# Patient Record
Sex: MALE | URBAN - METROPOLITAN AREA
[De-identification: names, ages, dates, MRNs, and addresses within clinical notes are randomized per-mention and may not be internally consistent; named-entity substitution may affect disease eponyms.]

---

## 2020-01-03 ENCOUNTER — NEW PATIENT (OUTPATIENT)
Dept: URBAN - METROPOLITAN AREA CLINIC 79 | Facility: CLINIC | Age: 65
End: 2020-01-03

## 2020-01-03 DIAGNOSIS — Z79.4: ICD-10-CM

## 2020-01-03 DIAGNOSIS — E11.3213: ICD-10-CM

## 2020-01-03 PROCEDURE — 92134 CPTRZ OPH DX IMG PST SGM RTA: CPT

## 2020-01-03 PROCEDURE — 99204 OFFICE O/P NEW MOD 45 MIN: CPT | Mod: 25

## 2020-01-03 PROCEDURE — 67028 INJECTION EYE DRUG: CPT

## 2020-01-03 ASSESSMENT — VISUAL ACUITY
OS_CC: 20/50
OD_CC: 20/25+2

## 2020-01-03 ASSESSMENT — TONOMETRY
OS_IOP_MMHG: 16
OD_IOP_MMHG: 19

## 2025-06-09 PROBLEM — Z00.00 ENCOUNTER FOR PREVENTIVE HEALTH EXAMINATION: Status: ACTIVE | Noted: 2025-06-09

## 2025-06-10 ENCOUNTER — INPATIENT (INPATIENT)
Facility: HOSPITAL | Age: 70
LOS: 1 days | Discharge: HOME | End: 2025-06-12
Attending: STUDENT IN AN ORGANIZED HEALTH CARE EDUCATION/TRAINING PROGRAM | Admitting: STUDENT IN AN ORGANIZED HEALTH CARE EDUCATION/TRAINING PROGRAM
Payer: MEDICARE

## 2025-06-10 ENCOUNTER — APPOINTMENT (OUTPATIENT)
Dept: HEART AND VASCULAR | Facility: CLINIC | Age: 70
End: 2025-06-10
Payer: MEDICARE

## 2025-06-10 VITALS
SYSTOLIC BLOOD PRESSURE: 96 MMHG | HEIGHT: 65 IN | HEART RATE: 66 BPM | DIASTOLIC BLOOD PRESSURE: 59 MMHG | BODY MASS INDEX: 22.33 KG/M2 | WEIGHT: 134 LBS | OXYGEN SATURATION: 98 %

## 2025-06-10 VITALS
RESPIRATION RATE: 16 BRPM | SYSTOLIC BLOOD PRESSURE: 150 MMHG | WEIGHT: 130.07 LBS | TEMPERATURE: 98 F | DIASTOLIC BLOOD PRESSURE: 82 MMHG | OXYGEN SATURATION: 98 % | HEART RATE: 66 BPM

## 2025-06-10 DIAGNOSIS — E78.5 HYPERLIPIDEMIA, UNSPECIFIED: ICD-10-CM

## 2025-06-10 DIAGNOSIS — I51.3 INTRACARDIAC THROMBOSIS, NOT ELSEWHERE CLASSIFIED: ICD-10-CM

## 2025-06-10 DIAGNOSIS — I50.20 UNSPECIFIED SYSTOLIC (CONGESTIVE) HEART FAILURE: ICD-10-CM

## 2025-06-10 DIAGNOSIS — I25.10 ATHEROSCLEROTIC HEART DISEASE OF NATIVE CORONARY ARTERY WITHOUT ANGINA PECTORIS: ICD-10-CM

## 2025-06-10 DIAGNOSIS — E11.9 TYPE 2 DIABETES MELLITUS WITHOUT COMPLICATIONS: ICD-10-CM

## 2025-06-10 LAB
ADD ON TEST-SPECIMEN IN LAB: SIGNIFICANT CHANGE UP
ANION GAP SERPL CALC-SCNC: 12 MMOL/L — SIGNIFICANT CHANGE UP (ref 5–17)
BASOPHILS # BLD AUTO: 0.03 K/UL — SIGNIFICANT CHANGE UP (ref 0–0.2)
BASOPHILS NFR BLD AUTO: 0.4 % — SIGNIFICANT CHANGE UP (ref 0–2)
BLD GP AB SCN SERPL QL: NEGATIVE — SIGNIFICANT CHANGE UP
BUN SERPL-MCNC: 27 MG/DL — HIGH (ref 7–23)
CALCIUM SERPL-MCNC: 9.1 MG/DL — SIGNIFICANT CHANGE UP (ref 8.4–10.5)
CHLORIDE SERPL-SCNC: 100 MMOL/L — SIGNIFICANT CHANGE UP (ref 96–108)
CO2 SERPL-SCNC: 25 MMOL/L — SIGNIFICANT CHANGE UP (ref 22–31)
CREAT SERPL-MCNC: 1.09 MG/DL — SIGNIFICANT CHANGE UP (ref 0.5–1.3)
EGFR: 74 ML/MIN/1.73M2 — SIGNIFICANT CHANGE UP
EGFR: 74 ML/MIN/1.73M2 — SIGNIFICANT CHANGE UP
EOSINOPHIL # BLD AUTO: 0.19 K/UL — SIGNIFICANT CHANGE UP (ref 0–0.5)
EOSINOPHIL NFR BLD AUTO: 2.4 % — SIGNIFICANT CHANGE UP (ref 0–6)
GLUCOSE SERPL-MCNC: 156 MG/DL — HIGH (ref 70–99)
HCT VFR BLD CALC: 37.4 % — LOW (ref 39–50)
HGB BLD-MCNC: 12.6 G/DL — LOW (ref 13–17)
IMM GRANULOCYTES NFR BLD AUTO: 0.4 % — SIGNIFICANT CHANGE UP (ref 0–0.9)
LYMPHOCYTES # BLD AUTO: 2.8 K/UL — SIGNIFICANT CHANGE UP (ref 1–3.3)
LYMPHOCYTES # BLD AUTO: 35.3 % — SIGNIFICANT CHANGE UP (ref 13–44)
MCHC RBC-ENTMCNC: 30.3 PG — SIGNIFICANT CHANGE UP (ref 27–34)
MCHC RBC-ENTMCNC: 33.7 G/DL — SIGNIFICANT CHANGE UP (ref 32–36)
MCV RBC AUTO: 89.9 FL — SIGNIFICANT CHANGE UP (ref 80–100)
MONOCYTES # BLD AUTO: 0.59 K/UL — SIGNIFICANT CHANGE UP (ref 0–0.9)
MONOCYTES NFR BLD AUTO: 7.4 % — SIGNIFICANT CHANGE UP (ref 2–14)
NEUTROPHILS # BLD AUTO: 4.3 K/UL — SIGNIFICANT CHANGE UP (ref 1.8–7.4)
NEUTROPHILS NFR BLD AUTO: 54.1 % — SIGNIFICANT CHANGE UP (ref 43–77)
NRBC BLD AUTO-RTO: 0 /100 WBCS — SIGNIFICANT CHANGE UP (ref 0–0)
PLATELET # BLD AUTO: 180 K/UL — SIGNIFICANT CHANGE UP (ref 150–400)
POTASSIUM SERPL-MCNC: 5 MMOL/L — SIGNIFICANT CHANGE UP (ref 3.5–5.3)
POTASSIUM SERPL-SCNC: 5 MMOL/L — SIGNIFICANT CHANGE UP (ref 3.5–5.3)
RBC # BLD: 4.16 M/UL — LOW (ref 4.2–5.8)
RBC # FLD: 16.2 % — HIGH (ref 10.3–14.5)
RH IG SCN BLD-IMP: POSITIVE — SIGNIFICANT CHANGE UP
SODIUM SERPL-SCNC: 137 MMOL/L — SIGNIFICANT CHANGE UP (ref 135–145)
TROPONIN T, HIGH SENSITIVITY RESULT: 35 NG/L — SIGNIFICANT CHANGE UP (ref 0–51)
WBC # BLD: 7.94 K/UL — SIGNIFICANT CHANGE UP (ref 3.8–10.5)
WBC # FLD AUTO: 7.94 K/UL — SIGNIFICANT CHANGE UP (ref 3.8–10.5)

## 2025-06-10 PROCEDURE — 93010 ELECTROCARDIOGRAM REPORT: CPT

## 2025-06-10 PROCEDURE — 99285 EMERGENCY DEPT VISIT HI MDM: CPT

## 2025-06-10 PROCEDURE — 99205 OFFICE O/P NEW HI 60 MIN: CPT

## 2025-06-10 RX ORDER — DAPAGLIFLOZIN 5 MG/1
10 TABLET, FILM COATED ORAL DAILY
Refills: 0 | Status: DISCONTINUED | OUTPATIENT
Start: 2025-06-11 | End: 2025-06-12

## 2025-06-10 RX ORDER — ISOSORBIDE MONONITRATE 60 MG/1
60 TABLET, EXTENDED RELEASE ORAL DAILY
Refills: 0 | Status: DISCONTINUED | OUTPATIENT
Start: 2025-06-11 | End: 2025-06-12

## 2025-06-10 RX ORDER — HEPARIN SODIUM 1000 [USP'U]/ML
2000 INJECTION INTRAVENOUS; SUBCUTANEOUS EVERY 6 HOURS
Refills: 0 | Status: DISCONTINUED | OUTPATIENT
Start: 2025-06-10 | End: 2025-06-11

## 2025-06-10 RX ORDER — GLUCAGON 3 MG/1
1 POWDER NASAL ONCE
Refills: 0 | Status: DISCONTINUED | OUTPATIENT
Start: 2025-06-10 | End: 2025-06-12

## 2025-06-10 RX ORDER — DEXTROSE 50 % IN WATER 50 %
25 SYRINGE (ML) INTRAVENOUS ONCE
Refills: 0 | Status: DISCONTINUED | OUTPATIENT
Start: 2025-06-10 | End: 2025-06-12

## 2025-06-10 RX ORDER — DEXTROSE 50 % IN WATER 50 %
12.5 SYRINGE (ML) INTRAVENOUS ONCE
Refills: 0 | Status: DISCONTINUED | OUTPATIENT
Start: 2025-06-10 | End: 2025-06-12

## 2025-06-10 RX ORDER — INSULIN LISPRO 100 U/ML
INJECTION, SOLUTION INTRAVENOUS; SUBCUTANEOUS AT BEDTIME
Refills: 0 | Status: DISCONTINUED | OUTPATIENT
Start: 2025-06-10 | End: 2025-06-12

## 2025-06-10 RX ORDER — HEPARIN SODIUM 1000 [USP'U]/ML
4500 INJECTION INTRAVENOUS; SUBCUTANEOUS EVERY 6 HOURS
Refills: 0 | Status: DISCONTINUED | OUTPATIENT
Start: 2025-06-10 | End: 2025-06-11

## 2025-06-10 RX ORDER — SODIUM CHLORIDE 9 G/1000ML
1000 INJECTION, SOLUTION INTRAVENOUS
Refills: 0 | Status: DISCONTINUED | OUTPATIENT
Start: 2025-06-10 | End: 2025-06-12

## 2025-06-10 RX ORDER — INSULIN GLARGINE-YFGN 100 [IU]/ML
10 INJECTION, SOLUTION SUBCUTANEOUS AT BEDTIME
Refills: 0 | Status: DISCONTINUED | OUTPATIENT
Start: 2025-06-10 | End: 2025-06-11

## 2025-06-10 RX ORDER — RANOLAZINE 1000 MG/1
500 TABLET, FILM COATED, EXTENDED RELEASE ORAL
Refills: 0 | Status: DISCONTINUED | OUTPATIENT
Start: 2025-06-11 | End: 2025-06-12

## 2025-06-10 RX ORDER — ASPIRIN 325 MG
81 TABLET ORAL DAILY
Refills: 0 | Status: DISCONTINUED | OUTPATIENT
Start: 2025-06-11 | End: 2025-06-12

## 2025-06-10 RX ORDER — ATORVASTATIN CALCIUM 80 MG/1
80 TABLET, FILM COATED ORAL AT BEDTIME
Refills: 0 | Status: DISCONTINUED | OUTPATIENT
Start: 2025-06-10 | End: 2025-06-12

## 2025-06-10 RX ORDER — DEXTROSE 50 % IN WATER 50 %
15 SYRINGE (ML) INTRAVENOUS ONCE
Refills: 0 | Status: DISCONTINUED | OUTPATIENT
Start: 2025-06-10 | End: 2025-06-12

## 2025-06-10 RX ORDER — HEPARIN SODIUM 1000 [USP'U]/ML
INJECTION INTRAVENOUS; SUBCUTANEOUS
Qty: 25000 | Refills: 0 | Status: DISCONTINUED | OUTPATIENT
Start: 2025-06-10 | End: 2025-06-11

## 2025-06-10 RX ORDER — METOPROLOL SUCCINATE 50 MG/1
25 TABLET, EXTENDED RELEASE ORAL DAILY
Refills: 0 | Status: DISCONTINUED | OUTPATIENT
Start: 2025-06-11 | End: 2025-06-12

## 2025-06-10 RX ORDER — GABAPENTIN 400 MG/1
600 CAPSULE ORAL THREE TIMES A DAY
Refills: 0 | Status: DISCONTINUED | OUTPATIENT
Start: 2025-06-11 | End: 2025-06-12

## 2025-06-10 RX ORDER — INSULIN LISPRO 100 U/ML
INJECTION, SOLUTION INTRAVENOUS; SUBCUTANEOUS
Refills: 0 | Status: DISCONTINUED | OUTPATIENT
Start: 2025-06-10 | End: 2025-06-12

## 2025-06-10 RX ADMIN — HEPARIN SODIUM 1100 UNIT(S)/HR: 1000 INJECTION INTRAVENOUS; SUBCUTANEOUS at 21:58

## 2025-06-10 RX ADMIN — INSULIN GLARGINE-YFGN 10 UNIT(S): 100 INJECTION, SOLUTION SUBCUTANEOUS at 22:34

## 2025-06-10 RX ADMIN — ATORVASTATIN CALCIUM 80 MILLIGRAM(S): 80 TABLET, FILM COATED ORAL at 22:17

## 2025-06-10 NOTE — H&P ADULT - ASSESSMENT
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 Salmeron for a second opinion regarding his known severe CAD. Admitted to [facility] Cardiac Telemetry for stable angina and known severe CAD with plan for R/LHC with Dr. Cristian Salmeron.

## 2025-06-10 NOTE — ED ADULT TRIAGE NOTE - CHIEF COMPLAINT QUOTE
pt complaining of chest pain sob and weakness on exertion x months pt denies complaints at rest were sent by CTS MD for admission no hx cardiac stent previously

## 2025-06-10 NOTE — H&P ADULT - NSHPLABSRESULTS_GEN_ALL_CORE
12.6   7.94  )-----------( 180      ( 10 Rufus 2025 17:54 )             37.4       06-10    137  |  100  |  27[H]  ----------------------------<  156[H]  5.0   |  25  |  1.09    Ca    9.1      10 Rufus 2025 17:54  Mg     1.9     06-10        PT/INR - ( 10 Rufus 2025 17:54 )   PT: 12.3 sec;   INR: 1.07          PTT - ( 10 Rufus 2025 17:54 )  PTT:36.9 sec    CARDIAC MARKERS ( 10 Rufus 2025 17:54 )  x     / x     / x     / x     / 3.0 ng/mL        Urinalysis Basic - ( 10 Rufus 2025 17:54 )    Color: x / Appearance: x / SG: x / pH: x  Gluc: 156 mg/dL / Ketone: x  / Bili: x / Urobili: x   Blood: x / Protein: x / Nitrite: x   Leuk Esterase: x / RBC: x / WBC x   Sq Epi: x / Non Sq Epi: x / Bacteria: x        EKG:

## 2025-06-10 NOTE — H&P ADULT - HISTORY OF PRESENT ILLNESS
70yoM, Czech speaking,  poor historian, w/ PMHx HLD, IDDM, severe 3vCAD _____, HFmrEF (EF 45%), LV apical thrombus who presented to outpatient cardiologist Dr. Salmeron for a second opinion regarding his known severe CAD. Patient endorses chest pain _______      [facility] ED course:   Labs significant for: Hgb/HCt 12.6/37.4, RBC 4.16, MCV 89.9, K 5.0, BUN/Cr 27/1.09.  Bedside echo performed by CCU Fellow reportedly preserved EF.    Admitted to [facility] Cardiac Telemetry for stable angina and known severe CAD with plan for R/LHC with Dr. Cristian Salmeron.    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 Salmeron for a second opinion regarding his known severe CAD. Patient endorses chest pain _______      [facility] ED course:   Labs significant for: Hgb/HCt 12.6/37.4, RBC 4.16, MCV 89.9, K 5.0, BUN/Cr 27/1.09.  Bedside echo performed by CCU Fellow reportedly preserved EF.    Admitted to [facility] Cardiac Telemetry for stable angina and known severe CAD with plan for R/LHC with Dr. Cristian Salmeron.    70yoM, Tamaz[place]/English speaking w/ PMHx HLD, IDDM, severe 2vCAD, HFmrEF (EF 45%), LV apical thrombus who presented to outpatient cardiologist Dr. Salmeron for a second opinion regarding his known severe CAD. Patient endorses worsening right sided chest pain for the past month. Patient notes that the pain is with exertion and relieved with rest and denied associated shortness of breath, dizziness and sweatiness. Patient currently denied chest pain, SOB, abdominal pain, fatigue, presyncope, syncope, PND, orthopnea, leg pain/swelling, N/V/D and recent sick contacts.    [facility] ED course:   Labs significant for: Hgb/HCt 12.6/37.4, RBC 4.16, MCV 89.9, K 5.0, BUN/Cr 27/1.09.  Bedside echo performed by CCU Fellow reportedly preserved EF.    Admitted to [facility] Cardiac Telemetry for stable angina and known severe CAD with plan for R/LHC with Dr. Cristian Salmeron.    70yoM, Kiswahili/Englishspeaking, FamHx ?MI (mother and father, passed away at 72) w/ PMHx HLD, IDDM, severe 2vCAD, HFmrEF (EF 45%), LV apical thrombus (on Eliquis last dose 6/10/25 AM) who presented to outpatient cardiologist Dr. Salmeron for a second opinion regarding his known severe CAD. Patient endorses worsening right sided chest pain for the past month. Patient notes that the pain is with exertion and relieved with rest and denied associated shortness of breath, dizziness and sweatiness. Patient currently denied chest pain, SOB, abdominal pain, fatigue, presyncope, syncope, PND, orthopnea, leg pain/swelling, N/V/D and recent sick contacts.    [facility] ED course:   Labs significant for: Hgb/HCt 12.6/37.4, RBC 4.16, MCV 89.9, K 5.0, BUN/Cr 27/1.09.  Bedside echo performed by CCU Fellow reportedly preserved EF.    Admitted to [facility] Cardiac Telemetry for stable angina and known severe CAD with plan for R/LHC with Dr. Cristian Salmeron.    70yoM, Albanian/Englishspeaking, FamHx ?MI (mother and father, passed away at 72) w/ PMHx HLD, IDDM, severe 2vCAD, HFmrEF (EF 45%), LV apical thrombus (on Eliquis last dose 6/10/25 AM) who presented to outpatient cardiologist Dr. Salmeron for a second opinion regarding his known severe CAD. Patient endorses worsening right sided chest pain for the past month. Patient notes that the pain is with exertion and relieved with rest and denied associated shortness of breath, dizziness and sweatiness. Patient currently denied chest pain, SOB, abdominal pain, fatigue, presyncope, syncope, PND, orthopnea, leg pain/swelling, N/V/D and recent sick contacts.    [facility] ED course:   Labs significant for: Hgb/HCt 12.6/37.4, RBC 4.16, MCV 89.9, K 5.0, BUN/Cr 27/1.09.  Bedside echo performed by CCU Fellow reportedly preserved EF.  EKG: NSR, TWI AVL and V2    Admitted to [facility] Cardiac Telemetry for stable angina and known severe CAD with plan for R/LHC with Dr. Cristain Salmeron.

## 2025-06-10 NOTE — ED ADULT NURSE NOTE - NSFALLHARMRISKINTERV_ED_ALL_ED

## 2025-06-10 NOTE — ED PROVIDER NOTE - OBJECTIVE STATEMENT
70 m with Hx DM, HLD  Recent exertional CP, began in April; initially evaluated in [place], medically managed without improvement.  Seen by cardiology today (Dr Salmeron), for cath/PCI tomorrow.  Currently pain-free.  Cardiology fellow performed limited bedside echo in ED, preserved LVEF.

## 2025-06-10 NOTE — ED PROVIDER NOTE - CLINICAL SUMMARY MEDICAL DECISION MAKING FREE TEXT BOX
Stable angina pectoralis for few months; not improving on medical management.  Seen by cardiology today, for cath/PCI tomorrow.  Currently asymptomatic in ED.  No signs of volume overload.  EKG NSR, TWI  Trop 35.  Limited echo by cardiology in ED, preserved EF.

## 2025-06-10 NOTE — ED PROVIDER NOTE - ATTENDING APP SHARED VISIT CONTRIBUTION OF CARE
Patient was interviewed and examined by the PA.  Case discussed with me and management plan formulated together.  Patient was taken to the floor before my assessment but had been seen by a Cardiologist PTA as well as the Cardiology Fellow.

## 2025-06-10 NOTE — H&P ADULT - PROBLEM SELECTOR PLAN 3
- on Lantus 20-22u daily at bedtime + Metformin 1000mg PO BID  - continue MEGAN and Lantus 10u daily at bedtime  o Endocrinology consult in AM  o f/u A1c in AM

## 2025-06-10 NOTE — ED ADULT NURSE NOTE - NS ED NURSE REPORT GIVEN TO FT
waxing and waning passive SI waxing and waning passive SI waxing and waning passive SI waxing and waning passive SI Ebony PANCHAL

## 2025-06-10 NOTE — H&P ADULT - PROBLEM SELECTOR PLAN 1
- LHC (5/9/25): p/mRCA 95%, mRCA 40-50%, 100% RPDA,  short LM, oLAD 95%, oLCx 30%, dLCx 70%, 100% mLAD, no EDP. LV Thrombus.   - RHC (5/9/25): RA 0, PA 21/2/10, CO/CI 4.8/2.5.  - Antianginal: continue home Ranexa 500mg PO BID, Imdur 60mg daily  - on Aspirin and Eliquis at home

## 2025-06-10 NOTE — ED PROVIDER NOTE - PHYSICAL EXAMINATION
CONSTITUTIONAL: pt in NAD   SKIN: Normal color and turgor.    HEAD: NC/AT.  EYES: Conjunctiva clear. Anicteric sclera.  ENT: Airway clear. Normal voice. MMM.  RESPIRATORY:  Normal respiratory rate and effort. Lungs CTA  CARDIOVASCULAR:  RRR S1S2, no MRG  GI:  Abdomen soft, nontender.    MSK: Neck supple.  No limb edema or muscular tenderness. No joint swelling or ROM limitation.  NEURO: Alert, clear mental status.  Speech clear. No focal deficits. Gait steady.

## 2025-06-10 NOTE — H&P ADULT - PROBLEM SELECTOR PLAN 5
- continue home Lipitor 80mg daily  o f/u lipid panel in AM    F: None  E: Replete if K<4 or Mag<2  N: DASH Diet  GIppx: Pantoprazole  VTEppx: Heparin gtt  Dispo: after revascularization  PT: not indicate

## 2025-06-10 NOTE — H&P ADULT - PROBLEM SELECTOR PLAN 2
o f/u limited TTE done by fellow  o formal TTE   - GDMT: continue home Toprol 25mg daily, Valsartan 40 mg daily, Farxiga 10mg daily  - strict intake and output, daily weight, - GDMT: continue home Toprol 25mg daily, Valsartan 40 mg daily, Farxiga 10mg daily  - strict intake and output, daily weight  o f/u limited TTE done by fellow  o formal TTE

## 2025-06-11 ENCOUNTER — RESULT REVIEW (OUTPATIENT)
Age: 70
End: 2025-06-11

## 2025-06-11 LAB
A1C WITH ESTIMATED AVERAGE GLUCOSE RESULT: 10 % — HIGH (ref 4–5.6)
ALBUMIN SERPL ELPH-MCNC: 3.8 G/DL — SIGNIFICANT CHANGE UP (ref 3.3–5)
ALP SERPL-CCNC: 53 U/L — SIGNIFICANT CHANGE UP (ref 40–120)
ALT FLD-CCNC: 17 U/L — SIGNIFICANT CHANGE UP (ref 10–45)
ANION GAP SERPL CALC-SCNC: 9 MMOL/L — SIGNIFICANT CHANGE UP (ref 5–17)
APTT BLD: 157.7 SEC — CRITICAL HIGH (ref 26.1–36.8)
APTT BLD: 171.3 SEC — CRITICAL HIGH (ref 26.1–36.8)
APTT BLD: 68.3 SEC — HIGH (ref 26.1–36.8)
AST SERPL-CCNC: 17 U/L — SIGNIFICANT CHANGE UP (ref 10–40)
BILIRUB SERPL-MCNC: 0.5 MG/DL — SIGNIFICANT CHANGE UP (ref 0.2–1.2)
BLD GP AB SCN SERPL QL: NEGATIVE — SIGNIFICANT CHANGE UP
BUN SERPL-MCNC: 27 MG/DL — HIGH (ref 7–23)
CALCIUM SERPL-MCNC: 9 MG/DL — SIGNIFICANT CHANGE UP (ref 8.4–10.5)
CHLORIDE SERPL-SCNC: 100 MMOL/L — SIGNIFICANT CHANGE UP (ref 96–108)
CHOLEST SERPL-MCNC: 125 MG/DL — SIGNIFICANT CHANGE UP
CO2 SERPL-SCNC: 29 MMOL/L — SIGNIFICANT CHANGE UP (ref 22–31)
CREAT SERPL-MCNC: 1.07 MG/DL — SIGNIFICANT CHANGE UP (ref 0.5–1.3)
EGFR: 76 ML/MIN/1.73M2 — SIGNIFICANT CHANGE UP
EGFR: 76 ML/MIN/1.73M2 — SIGNIFICANT CHANGE UP
ESTIMATED AVERAGE GLUCOSE: 240 MG/DL — HIGH (ref 68–114)
FERRITIN SERPL-MCNC: 24 NG/ML — LOW (ref 30–400)
GLUCOSE SERPL-MCNC: 87 MG/DL — SIGNIFICANT CHANGE UP (ref 70–99)
HCT VFR BLD CALC: 36.6 % — LOW (ref 39–50)
HCT VFR BLD CALC: 37.7 % — LOW (ref 39–50)
HDLC SERPL-MCNC: 43 MG/DL — SIGNIFICANT CHANGE UP
HGB BLD-MCNC: 12.3 G/DL — LOW (ref 13–17)
HGB BLD-MCNC: 12.7 G/DL — LOW (ref 13–17)
INR BLD: 1.18 — HIGH (ref 0.85–1.16)
IRON SATN MFR SERPL: 20 % — SIGNIFICANT CHANGE UP (ref 16–55)
IRON SATN MFR SERPL: 62 UG/DL — SIGNIFICANT CHANGE UP (ref 45–165)
LDLC SERPL-MCNC: 70 MG/DL — SIGNIFICANT CHANGE UP
LIPID PNL WITH DIRECT LDL SERPL: 70 MG/DL — SIGNIFICANT CHANGE UP
MAGNESIUM SERPL-MCNC: 2 MG/DL — SIGNIFICANT CHANGE UP (ref 1.6–2.6)
MCHC RBC-ENTMCNC: 29.4 PG — SIGNIFICANT CHANGE UP (ref 27–34)
MCHC RBC-ENTMCNC: 30 PG — SIGNIFICANT CHANGE UP (ref 27–34)
MCHC RBC-ENTMCNC: 33.6 G/DL — SIGNIFICANT CHANGE UP (ref 32–36)
MCHC RBC-ENTMCNC: 33.7 G/DL — SIGNIFICANT CHANGE UP (ref 32–36)
MCV RBC AUTO: 87.6 FL — SIGNIFICANT CHANGE UP (ref 80–100)
MCV RBC AUTO: 89.1 FL — SIGNIFICANT CHANGE UP (ref 80–100)
NONHDLC SERPL-MCNC: 82 MG/DL — SIGNIFICANT CHANGE UP
NRBC BLD AUTO-RTO: 0 /100 WBCS — SIGNIFICANT CHANGE UP (ref 0–0)
NRBC BLD AUTO-RTO: 0 /100 WBCS — SIGNIFICANT CHANGE UP (ref 0–0)
PLATELET # BLD AUTO: 168 K/UL — SIGNIFICANT CHANGE UP (ref 150–400)
PLATELET # BLD AUTO: 185 K/UL — SIGNIFICANT CHANGE UP (ref 150–400)
POTASSIUM SERPL-MCNC: 4.2 MMOL/L — SIGNIFICANT CHANGE UP (ref 3.5–5.3)
POTASSIUM SERPL-SCNC: 4.2 MMOL/L — SIGNIFICANT CHANGE UP (ref 3.5–5.3)
PROT SERPL-MCNC: 6.7 G/DL — SIGNIFICANT CHANGE UP (ref 6–8.3)
PROTHROM AB SERPL-ACNC: 13.5 SEC — HIGH (ref 9.9–13.4)
RBC # BLD: 4.18 M/UL — LOW (ref 4.2–5.8)
RBC # BLD: 4.23 M/UL — SIGNIFICANT CHANGE UP (ref 4.2–5.8)
RBC # FLD: 16 % — HIGH (ref 10.3–14.5)
RBC # FLD: 16.2 % — HIGH (ref 10.3–14.5)
RH IG SCN BLD-IMP: POSITIVE — SIGNIFICANT CHANGE UP
SODIUM SERPL-SCNC: 138 MMOL/L — SIGNIFICANT CHANGE UP (ref 135–145)
T4 AB SER-ACNC: 7.64 UG/DL — SIGNIFICANT CHANGE UP (ref 4.5–11.7)
TIBC SERPL-MCNC: 314 UG/DL — SIGNIFICANT CHANGE UP (ref 220–430)
TRANSFERRIN SERPL-MCNC: 264 MG/DL — SIGNIFICANT CHANGE UP (ref 200–360)
TRIGL SERPL-MCNC: 54 MG/DL — SIGNIFICANT CHANGE UP
TSH SERPL-MCNC: 1.72 UIU/ML — SIGNIFICANT CHANGE UP (ref 0.27–4.2)
UIBC SERPL-MCNC: 252 UG/DL — SIGNIFICANT CHANGE UP (ref 110–370)
WBC # BLD: 6.28 K/UL — SIGNIFICANT CHANGE UP (ref 3.8–10.5)
WBC # BLD: 7.69 K/UL — SIGNIFICANT CHANGE UP (ref 3.8–10.5)
WBC # FLD AUTO: 6.28 K/UL — SIGNIFICANT CHANGE UP (ref 3.8–10.5)
WBC # FLD AUTO: 7.69 K/UL — SIGNIFICANT CHANGE UP (ref 3.8–10.5)

## 2025-06-11 PROCEDURE — 99152 MOD SED SAME PHYS/QHP 5/>YRS: CPT

## 2025-06-11 PROCEDURE — 93454 CORONARY ARTERY ANGIO S&I: CPT | Mod: 26,59

## 2025-06-11 PROCEDURE — 93010 ELECTROCARDIOGRAM REPORT: CPT

## 2025-06-11 PROCEDURE — 92978 ENDOLUMINL IVUS OCT C 1ST: CPT | Mod: 26,RC

## 2025-06-11 PROCEDURE — 99222 1ST HOSP IP/OBS MODERATE 55: CPT | Mod: GC

## 2025-06-11 PROCEDURE — 99233 SBSQ HOSP IP/OBS HIGH 50: CPT | Mod: 25

## 2025-06-11 PROCEDURE — 93306 TTE W/DOPPLER COMPLETE: CPT | Mod: 26

## 2025-06-11 PROCEDURE — 92933 PRQ TRLML C ATHRC ST ANGIOP1: CPT | Mod: RC

## 2025-06-11 RX ORDER — SODIUM CHLORIDE 9 G/1000ML
1000 INJECTION, SOLUTION INTRAVENOUS
Refills: 0 | Status: DISCONTINUED | OUTPATIENT
Start: 2025-06-11 | End: 2025-06-12

## 2025-06-11 RX ORDER — DEXTROSE 50 % IN WATER 50 %
12.5 SYRINGE (ML) INTRAVENOUS ONCE
Refills: 0 | Status: DISCONTINUED | OUTPATIENT
Start: 2025-06-11 | End: 2025-06-12

## 2025-06-11 RX ORDER — DEXTROSE 50 % IN WATER 50 %
15 SYRINGE (ML) INTRAVENOUS ONCE
Refills: 0 | Status: DISCONTINUED | OUTPATIENT
Start: 2025-06-11 | End: 2025-06-12

## 2025-06-11 RX ORDER — HEPARIN SODIUM 1000 [USP'U]/ML
2000 INJECTION INTRAVENOUS; SUBCUTANEOUS EVERY 6 HOURS
Refills: 0 | Status: DISCONTINUED | OUTPATIENT
Start: 2025-06-11 | End: 2025-06-11

## 2025-06-11 RX ORDER — DEXTROSE 50 % IN WATER 50 %
12.5 SYRINGE (ML) INTRAVENOUS ONCE
Refills: 0 | Status: COMPLETED | OUTPATIENT
Start: 2025-06-11 | End: 2025-06-11

## 2025-06-11 RX ORDER — SODIUM CHLORIDE 9 G/1000ML
1000 INJECTION, SOLUTION INTRAVENOUS
Refills: 0 | Status: DISCONTINUED | OUTPATIENT
Start: 2025-06-11 | End: 2025-06-11

## 2025-06-11 RX ORDER — GLUCAGON 3 MG/1
1 POWDER NASAL ONCE
Refills: 0 | Status: DISCONTINUED | OUTPATIENT
Start: 2025-06-11 | End: 2025-06-12

## 2025-06-11 RX ORDER — CLOPIDOGREL BISULFATE 75 MG/1
75 TABLET, FILM COATED ORAL DAILY
Refills: 0 | Status: DISCONTINUED | OUTPATIENT
Start: 2025-06-12 | End: 2025-06-12

## 2025-06-11 RX ORDER — INSULIN LISPRO 100 U/ML
4 INJECTION, SOLUTION INTRAVENOUS; SUBCUTANEOUS
Refills: 0 | Status: DISCONTINUED | OUTPATIENT
Start: 2025-06-11 | End: 2025-06-12

## 2025-06-11 RX ORDER — HEPARIN SODIUM 1000 [USP'U]/ML
4500 INJECTION INTRAVENOUS; SUBCUTANEOUS ONCE
Refills: 0 | Status: DISCONTINUED | OUTPATIENT
Start: 2025-06-11 | End: 2025-06-11

## 2025-06-11 RX ORDER — HEPARIN SODIUM 1000 [USP'U]/ML
4500 INJECTION INTRAVENOUS; SUBCUTANEOUS EVERY 6 HOURS
Refills: 0 | Status: DISCONTINUED | OUTPATIENT
Start: 2025-06-11 | End: 2025-06-11

## 2025-06-11 RX ORDER — CLOPIDOGREL BISULFATE 75 MG/1
600 TABLET, FILM COATED ORAL ONCE
Refills: 0 | Status: COMPLETED | OUTPATIENT
Start: 2025-06-11 | End: 2025-06-11

## 2025-06-11 RX ORDER — DEXTROSE 50 % IN WATER 50 %
25 SYRINGE (ML) INTRAVENOUS ONCE
Refills: 0 | Status: DISCONTINUED | OUTPATIENT
Start: 2025-06-11 | End: 2025-06-12

## 2025-06-11 RX ORDER — HEPARIN SODIUM 1000 [USP'U]/ML
700 INJECTION INTRAVENOUS; SUBCUTANEOUS
Qty: 25000 | Refills: 0 | Status: DISCONTINUED | OUTPATIENT
Start: 2025-06-11 | End: 2025-06-11

## 2025-06-11 RX ADMIN — GABAPENTIN 600 MILLIGRAM(S): 400 CAPSULE ORAL at 14:45

## 2025-06-11 RX ADMIN — HEPARIN SODIUM 900 UNIT(S)/HR: 1000 INJECTION INTRAVENOUS; SUBCUTANEOUS at 08:30

## 2025-06-11 RX ADMIN — DAPAGLIFLOZIN 10 MILLIGRAM(S): 5 TABLET, FILM COATED ORAL at 10:56

## 2025-06-11 RX ADMIN — Medication 40 MILLIGRAM(S): at 06:24

## 2025-06-11 RX ADMIN — METOPROLOL SUCCINATE 25 MILLIGRAM(S): 50 TABLET, EXTENDED RELEASE ORAL at 05:42

## 2025-06-11 RX ADMIN — Medication 40 MILLIGRAM(S): at 05:43

## 2025-06-11 RX ADMIN — GABAPENTIN 600 MILLIGRAM(S): 400 CAPSULE ORAL at 05:41

## 2025-06-11 RX ADMIN — HEPARIN SODIUM 0 UNIT(S)/HR: 1000 INJECTION INTRAVENOUS; SUBCUTANEOUS at 12:17

## 2025-06-11 RX ADMIN — Medication 12.5 MILLILITER(S): at 16:18

## 2025-06-11 RX ADMIN — Medication 81 MILLIGRAM(S): at 10:56

## 2025-06-11 RX ADMIN — GABAPENTIN 600 MILLIGRAM(S): 400 CAPSULE ORAL at 21:47

## 2025-06-11 RX ADMIN — SODIUM CHLORIDE 75 MILLILITER(S): 9 INJECTION, SOLUTION INTRAVENOUS at 16:18

## 2025-06-11 RX ADMIN — ATORVASTATIN CALCIUM 80 MILLIGRAM(S): 80 TABLET, FILM COATED ORAL at 21:47

## 2025-06-11 RX ADMIN — CLOPIDOGREL BISULFATE 600 MILLIGRAM(S): 75 TABLET, FILM COATED ORAL at 12:16

## 2025-06-11 RX ADMIN — HEPARIN SODIUM 700 UNIT(S)/HR: 1000 INJECTION INTRAVENOUS; SUBCUTANEOUS at 13:18

## 2025-06-11 RX ADMIN — HEPARIN SODIUM 0 UNIT(S)/HR: 1000 INJECTION INTRAVENOUS; SUBCUTANEOUS at 04:36

## 2025-06-11 RX ADMIN — Medication 150 MILLILITER(S): at 21:27

## 2025-06-11 RX ADMIN — ISOSORBIDE MONONITRATE 60 MILLIGRAM(S): 60 TABLET, EXTENDED RELEASE ORAL at 10:56

## 2025-06-11 RX ADMIN — RANOLAZINE 500 MILLIGRAM(S): 1000 TABLET, FILM COATED, EXTENDED RELEASE ORAL at 05:41

## 2025-06-11 RX ADMIN — HEPARIN SODIUM 900 UNIT(S)/HR: 1000 INJECTION INTRAVENOUS; SUBCUTANEOUS at 05:37

## 2025-06-11 NOTE — PATIENT PROFILE ADULT - DO YOU FEEL LIKE HURTING YOURSELF OR OTHERS?
"Last Written Prescription Date:  8/29/17  Last Fill Quantity: 200 strip,  # refills: 3   Last office visit: 11/8/2018 with prescribing provider:  Kerline   Future Office Visit:   Next 5 appointments (look out 90 days)     Dec 03, 2018  9:00 AM CST   [facility]   [facility]    [address] [facility] [address]   [phone]                 Requested Prescriptions   Pending Prescriptions Disp Refills     TRUE METRIX BLOOD GLUCOSE TEST test strip [Pharmacy Med Name: TRUE METRIX SELF MONITORING BLOOD GLUCOSE STRIPS   Strip] 100 strip 3     Sig: USE TO TEST DAILY    Diabetic Supplies Protocol Passed    11/14/2018  1:40 PM       Passed - Patient is 18 years of age or older       Passed - Recent (6 mo) or future (30 days) visit within the authorizing provider's specialty    Patient had office visit in the last 6 months or has a visit in the next 30 days with authorizing provider.  See \"Patient Info\" tab in inbasket, or \"Choose Columns\" in Meds & Orders section of the refill encounter.              " no

## 2025-06-11 NOTE — PROGRESS NOTE ADULT - ASSESSMENT
69 y/o M, Arabic/English speaking w/ PMHx HLD, IDDM, severe 2vCAD, HFmrEF (EF 45%), LV apical thrombus (on Eliquis last dose 6/10/25 AM) who presented to outpatient cardiologist Dr. Salmeron for a second opinion regarding his known severe CAD and chest pain. Admitted to [facility] Cardiac Telemetry for stable angina and known severe CAD with plan for R/LHC with Dr. Cristian Salmeron.      71 y/o M, Slovenian/English speaking w/ PMHx HLD, IDDM, severe 2vCAD, HFmrEF (EF 45%), LV apical thrombus (on Eliquis last dose 6/10/25 AM) who presented to outpatient cardiologist Dr. Salmeron for a second opinion regarding his known severe CAD and chest pain. Admitted to [facility] Cardiac Telemetry for stable angina and known severe CAD with plan for R/LHC with Dr. Cristian Salmeron.       ASA: II 			Mallampati class: II 		    Sedation Plan: [ ]  None [x  ] Moderate  [ ]  Deep [ ]   General Anesthesia     Patient Is Suitable Candidate For Sedation?   [x ]  Yes  [ ] No   [ ] Not Applicable     Cardiac: Risks & benefits of procedure and alternative therapy have been explained to the patient &/or HCP including but not limited to: allergic reaction, bleeding, infection, arrhythmia, renal and vascular compromise, limb damage, MI, CVA, emergent CABG and death. Informed consent obtained and in chart.    Peripheral: Risks & benefits of procedure and alternative therapy have been explained to the patient &/or HCP including but not limited to: allergic reaction, bleeding, infection, arrhythmia, renal and vascular compromise, limb damage, MI, CVA, emergent Vascular Surgery and death. Informed consent obtained and in chart.

## 2025-06-11 NOTE — PROGRESS NOTE ADULT - PROBLEM SELECTOR PLAN 4
- on Lantus 20-22u daily at bedtime + Metformin 1000mg PO BID  - a1c 10.0%   - continue MEGAN and Lantus 10u daily at bedtime - on Lantus 20-22u daily at bedtime + Metformin 1000mg PO BID + farxiga 10 mg PO daily   - a1c 10.0%   - endocrine consulted, appreciate recs   - continue MEGAN and Lantus 10u daily at bedtime - on Lantus 20-22u daily at bedtime + Metformin 1000mg PO BID + farxiga 10 mg PO daily   - a1c 10.0%   - endocrine consulted, appreciate recs    - c/w low insulin sliding scale. Start insulin 4 units premeals   - lantus d/c'd

## 2025-06-11 NOTE — CONSULT NOTE ADULT - ATTENDING COMMENTS
Pt seen on rounds this afternoon.  70-yo man with HTN, HLP and type 2 DM who was referred to the hospital by his cardiologist because of worsening exertional chest pain.  Has significant 2VD on cath as well as a mural (apical) thrombus.  Decision regarding revascularization is still pending  Was diagnosed with DM 30-35 years ago.  Is currently managed on a combination of Lantus 20 qhs, metformin and Farxiga.  Reports fingersticks at home which are in the 60-90 range overnight and in the morning with post-prandials during the day "150-200."  A1c level is nonetheless quite high at 10%  Was hypoglycemic this morning to 63 mg% despite receiving only half of his usual home insulin.  FS at noon was 90 mg%, but he was back down to the 60 range at the time of our visit in the mid-afternoon.  (He was not on IV fluids)  WAs about to leave for the cath lab shortly after we saw him.  --Would give half an amp of D50W now to treat the hypoglycemia, start D5/NS at 60/hr to prevent further hypoglycemia (and also to provide hydration since he will be receiving dye)  --Was clearly over-basalized at home, and was hypoglycemic this morning after only 10 units Lantus last night--to D/C the Lantus  --Would start 4 units premeal lispro

## 2025-06-11 NOTE — CONSULT NOTE ADULT - ATTENDING SUPERVISION STATEMENT
Large Joint Aspiration/Injection: R knee    Date/Time: 11/20/2024 2:45 PM    Performed by: John Martinez MD  Authorized by: John Martinez MD    Consent Done?:  Yes (Verbal)  Indications:  Joint swelling  Site marked: the procedure site was marked    Timeout: prior to procedure the correct patient, procedure, and site was verified    Prep: patient was prepped and draped in usual sterile fashion      Local anesthesia used?: Yes    Local anesthetic:  Topical anesthetic and lidocaine 1% without epinephrine  Anesthetic total (ml):  14      Details:  Needle Size:  22 G  Ultrasonic Guidance for needle placement?: No    Approach:  Lateral  Location:  Knee  Site:  R knee  Medications:  4 mL LIDOcaine HCL 10 mg/ml (1%) 10 mg/mL (1 %); 40 mg triamcinolone acetonide 40 mg/mL  Aspirate amount (mL):  35  Aspirate:  Bloody  Lab: fluid sent for laboratory analysis    Patient tolerance:  Patient tolerated the procedure well with no immediate complications     A mixture containing 4 cc of 1% lidocaine and 1 cc of Kenalog (40mg/cc) was injected.      Fellow

## 2025-06-11 NOTE — CONSULT NOTE ADULT - SUBJECTIVE AND OBJECTIVE BOX
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 Salmeron for a second opinion regarding his known severe CAD. Patient endorses worsening right sided chest pain for the past month. Patient notes that the pain is with exertion and relieved with rest and denied associated shortness of breath, dizziness and sweatiness. Patient currently denied chest pain, SOB, abdominal pain, fatigue, presyncope, syncope, PND, orthopnea, leg pain/swelling, N/V/D and recent sick contacts.    [facility] ED course:   Labs significant for: Hgb/HCt 12.6/37.4, RBC 4.16, MCV 89.9, K 5.0, BUN/Cr 27/1.09.  Bedside echo performed by CCU Fellow reportedly preserved EF.  EKG: NSR, TWI AVL and V2    Admitted to [facility] Cardiac Telemetry for stable angina and known severe CAD with plan for R/LHC with Dr. Cristian Salmeron.     Endocrinology has been consulted for Diabetes Management.    DIABETES HISTORY  - Age at diagnosis:   - Diabetes managed outpatient by:  - Current Therapy: metformin 1000mg BID, lantus 20 units at bedtime, farxiga 10mg qd  - History of other regimens:   - Home glucose readings:  - History of DKA/HHS:   - Diabetic Complications: Neuropathy (gabapentin 600mg TID), CAD   - Diet:        FAMILY HISTORY  - Diabetes:    SOCIAL HISTORY  - Work:  - Alcohol:  - Smoking:      CURRENT MEDICATIONS  aspirin enteric coated 81 milliGRAM(s) Oral daily  atorvastatin 80 milliGRAM(s) Oral at bedtime  dapagliflozin 10 milliGRAM(s) Oral daily  dextrose 5%. 1000 milliLiter(s) IV Continuous <Continuous>  dextrose 5%. 1000 milliLiter(s) IV Continuous <Continuous>  dextrose 50% Injectable 25 Gram(s) IV Push once  dextrose 50% Injectable 12.5 Gram(s) IV Push once  dextrose 50% Injectable 25 Gram(s) IV Push once  dextrose Oral Gel 15 Gram(s) Oral once PRN  gabapentin 600 milliGRAM(s) Oral three times a day  glucagon  Injectable 1 milliGRAM(s) IntraMuscular once  heparin   Injectable 4500 Unit(s) IV Push once  heparin   Injectable 4500 Unit(s) IV Push every 6 hours PRN  heparin   Injectable 2000 Unit(s) IV Push every 6 hours PRN  heparin  Infusion. 700 Unit(s)/Hr IV Continuous <Continuous>  insulin glargine Injectable (LANTUS) 10 Unit(s) SubCutaneous at bedtime  insulin lispro (ADMELOG) corrective regimen sliding scale   SubCutaneous three times a day before meals  insulin lispro (ADMELOG) corrective regimen sliding scale   SubCutaneous at bedtime  isosorbide   mononitrate ER Tablet (IMDUR) 60 milliGRAM(s) Oral daily  metoprolol succinate ER 25 milliGRAM(s) Oral daily  pantoprazole    Tablet 40 milliGRAM(s) Oral before breakfast  ranolazine 500 milliGRAM(s) Oral two times a day  valsartan 40 milliGRAM(s) Oral daily      REVIEW OF SYSTEMS  Constitutional:  Negative fever, chills   Cardiovascular:  Negative for chest pain or palpitations.  Respiratory:  Negative for cough, wheezing, or shortness of breath.   Gastrointestinal:  Negative for nausea, vomiting, diarrhea, constipation, or abdominal pain.  Genitourinary:  Negative frequency, urgency or dysuria.  Neurologic:  No headache, confusion, dizziness    PHYSICAL EXAM  Vital Signs Last 24 Hrs  T(C): 36.8 (11 Jun 2025 05:15), Max: 36.8 (10 Rufus 2025 18:06)  T(F): 98.2 (11 Jun 2025 05:15), Max: 98.3 (10 Rufus 2025 18:06)  HR: 60 (11 Jun 2025 09:19) (58 - 67)  BP: 123/59 (11 Jun 2025 10:50) (123/59 - 150/82)  BP(mean): 85 (11 Jun 2025 10:50) (85 - 94)  RR: 17 (11 Jun 2025 09:19) (16 - 20)  SpO2: 97% (11 Jun 2025 09:19) (96% - 98%)    Parameters below as of 11 Jun 2025 09:19  Patient On (Oxygen Delivery Method): room air      Constitutional: Awake, alert  HEENT: Normocephalic, atraumatic, TOAN  Neck: supple, no acanthosis, no thyromegaly or palpable thyroid nodules.  Respiratory: Lungs clear to ausculation bilaterally.   Cardiovascular: regular rate and rhythm  GI: soft, non-tender, non-distended, bowel sounds present  Extremities: No lower extremity edema, peripheral pulses present.     LABS  CBC - WBC/HGB/HTC/PLT: 6.28/12.3/36.6/168 (06-11-25)  BMP: Na/K/Cl/Bicarb/BUN/Cr/Gluc: 138/4.2/100/29/27/1.07/87 (06-11-25)  Anion Gap: 9 (06-11-25)  eGFR: 76 (06-11-25)  Calcium: 9.0 (06-11-25)  Phosphorus: -- (06-11-25)  Magnesium: 2.0 (06-11-25)  LFT - Alb/Tprot/Tbili/Dbili/AlkPhos/ALT/AST: 3.8/--/0.5/--/53/17/17 (06-11-25)  PT/aPTT/INR: --/157.7/-- (06-11-25)  Thyroid Stimulating Hormone, Serum: 1.720 (06-11-25)  Total T4/Free T4: 7.64/-- (06-11-25)  CBC - WBC/HGB/HTC/PLT: 6.28/12.3/36.6/168 (06-11-25)BMP: Na/K/Cl/Bicarb/BUN/Cr/Gluc: 138/4.2/100/29/27/1.07/87 (06-11-25)  Anion Gap: 9 (06-11-25)  eGFR: 76 (06-11-25)  Calcium: 9.0 (06-11-25)  Phosphorus: -- (06-11-25)  Magnesium: 2.0 (06-11-25)    CAPILLARY BLOOD GLUCOSE & INSULIN RECEIVED  181 mg/dL (06-10 @ 21:50) lantus 10 + 0   63 mg/dL (06-11 @ 07:44) X  95 mg/dL (06-11 @ 08:35) X  90 mg/dL (06-11 @ 12:02) X NPO for cath.       06-10-25 @ 07:01  -  06-11-25 @ 07:00  --------------------------------------------------------  IN: 72 mL / OUT: 750 mL / NET: -678 mL    06-11-25 @ 07:01  -  06-11-25 @ 13:49  --------------------------------------------------------  IN: 0 mL / OUT: 300 mL / NET: -300 mL      ASSESSMENT / RECOMMENDATIONS  REBECA PRUITT is a 70yoM, Lao/Englishspeaking, FamHx ?MI (mother and father, passed away at 72) w/ PMHx HLD, IDDM, severe 2vCAD, HFmrEF (EF 45%), LV apical thrombus (on Eliquis last dose 6/10/25 AM) who presented to outpatient cardiologist Dr. Salmeron for a second opinion regarding his known severe CAD. Patient endorses worsening right sided chest pain for the past month. Admitted of /[facility] with Dr. Salmeron. Endocrinology consulted for diabetes management.     A1C: 10.0 %  Creatinine: 1.07, GFR: 76  Weight: 59, BMI: 21.6    #Uncontrolled Type 2 diabetes mellitus with hyperglycemia  - Please keep lantus   units at bedtime.   - Keep lispro   units before each meal.  - Continue lispro moderate dose sliding scale before meals and at bedtime.  - Patient's fingerstick glucose goal is 100-180 mg/dL.    - Discharge recommendations to be discussed.   - Patient can follow up at discharge with [facility] by calling [phone] to make an appointment.      Case discussed with Dr. Dumont. Primary team updated.       Lalita Hernandez   Endocrinology Fellow    Service Pager: [phone]  HISTORY OF PRESENT ILLNESS  REBECA PRUITT is a 70yoM, Kazakh/English speaking, FamHx ?MI (mother and father, passed away at 72) w/ PMHx HLD, IDDM, severe 2vCAD, HFmrEF (EF 45%), LV apical thrombus (on Eliquis last dose 6/10/25 AM) who presented to outpatient cardiologist Dr. Salmeron for a second opinion regarding his known severe CAD. Patient endorses worsening right sided chest pain for the past month. Patient notes that the pain is with exertion and relieved with rest and denied associated shortness of breath, dizziness and sweatiness. Patient currently denied chest pain, SOB, abdominal pain, fatigue, presyncope, syncope, PND, orthopnea, leg pain/swelling, N/V/D and recent sick contacts.    [facility] ED course:   Labs significant for: Hgb/HCt 12.6/37.4, RBC 4.16, MCV 89.9, K 5.0, BUN/Cr 27/1.09.  Bedside echo performed by CCU Fellow reportedly preserved EF.  EKG: NSR, TWI AVL and V2    Admitted to [facility] Cardiac Telemetry for stable angina and known severe CAD with plan for R/LHC with Dr. Cristian Salmeron.     Endocrinology has been consulted for Diabetes Management.    DIABETES HISTORY  - Age at diagnosis: >30-35 years ago when he initialy moved to the .   - Diabetes managed outpatient by: PCP  - Current Therapy: metformin 1000mg BID, lantus 20 units at bedtime, farxiga 10mg qd (had a free 90 day supply which he ran out)   - History of other regimens: Ozempic 0.5mg subucut weekly which he ran out of   - Home glucose readings: Reporst his AM FS are low in the 60s and he has to drink juice. Post-meal -170. Uses a glucometer. States that his insurance did not cover dexcom.   - History of DKA/HHS: Denies   - Diabetic Complications: Neuropathy (gabapentin 600mg TID), CAD, needs optho follow up.   - Diet:    Breakfast: whole wheat bread/ scrambled eggs   Lunch: rice/ reynolds  Dinner: oatmeal   Denies soda/ juice  Desserts rarely    FAMILY HISTORY  - Diabetes: Siblings     CURRENT MEDICATIONS  aspirin enteric coated 81 milliGRAM(s) Oral daily  atorvastatin 80 milliGRAM(s) Oral at bedtime  dapagliflozin 10 milliGRAM(s) Oral daily  dextrose 5%. 1000 milliLiter(s) IV Continuous <Continuous>  dextrose 5%. 1000 milliLiter(s) IV Continuous <Continuous>  dextrose 50% Injectable 25 Gram(s) IV Push once  dextrose 50% Injectable 12.5 Gram(s) IV Push once  dextrose 50% Injectable 25 Gram(s) IV Push once  dextrose Oral Gel 15 Gram(s) Oral once PRN  gabapentin 600 milliGRAM(s) Oral three times a day  glucagon  Injectable 1 milliGRAM(s) IntraMuscular once  heparin   Injectable 4500 Unit(s) IV Push once  heparin   Injectable 4500 Unit(s) IV Push every 6 hours PRN  heparin   Injectable 2000 Unit(s) IV Push every 6 hours PRN  heparin  Infusion. 700 Unit(s)/Hr IV Continuous <Continuous>  insulin glargine Injectable (LANTUS) 10 Unit(s) SubCutaneous at bedtime  insulin lispro (ADMELOG) corrective regimen sliding scale   SubCutaneous three times a day before meals  insulin lispro (ADMELOG) corrective regimen sliding scale   SubCutaneous at bedtime  isosorbide   mononitrate ER Tablet (IMDUR) 60 milliGRAM(s) Oral daily  metoprolol succinate ER 25 milliGRAM(s) Oral daily  pantoprazole    Tablet 40 milliGRAM(s) Oral before breakfast  ranolazine 500 milliGRAM(s) Oral two times a day  valsartan 40 milliGRAM(s) Oral daily      REVIEW OF SYSTEMS  Constitutional:  Negative fever, chills   Cardiovascular:  Negative for chest pain or palpitations.  Respiratory:  Negative for cough, wheezing, or shortness of breath.   Gastrointestinal:  Negative for nausea, vomiting, diarrhea, constipation, or abdominal pain.  Genitourinary:  Negative frequency, urgency or dysuria.  Neurologic:  No headache, confusion, dizziness    PHYSICAL EXAM  Vital Signs Last 24 Hrs  T(C): 36.8 (11 Jun 2025 05:15), Max: 36.8 (10 Rufus 2025 18:06)  T(F): 98.2 (11 Jun 2025 05:15), Max: 98.3 (10 Rufus 2025 18:06)  HR: 60 (11 Jun 2025 09:19) (58 - 67)  BP: 123/59 (11 Jun 2025 10:50) (123/59 - 150/82)  BP(mean): 85 (11 Jun 2025 10:50) (85 - 94)  RR: 17 (11 Jun 2025 09:19) (16 - 20)  SpO2: 97% (11 Jun 2025 09:19) (96% - 98%)    Parameters below as of 11 Jun 2025 09:19  Patient On (Oxygen Delivery Method): room air    Constitutional: Awake, alert  HEENT: Normocephalic, atraumatic,  Neck: supple, no acanthosis, no thyromegaly or palpable thyroid nodules.  Respiratory: Lungs clear to ausculation bilaterally.   Cardiovascular: regular rate and rhythm  GI: soft, non-tender, non-distended, bowel sounds present  Extremities: No lower extremity edema, peripheral pulses present.     LABS  CBC - WBC/HGB/HTC/PLT: 6.28/12.3/36.6/168 (06-11-25)  BMP: Na/K/Cl/Bicarb/BUN/Cr/Gluc: 138/4.2/100/29/27/1.07/87 (06-11-25)  Anion Gap: 9 (06-11-25)  eGFR: 76 (06-11-25)  Calcium: 9.0 (06-11-25)  Phosphorus: -- (06-11-25)  Magnesium: 2.0 (06-11-25)  LFT - Alb/Tprot/Tbili/Dbili/AlkPhos/ALT/AST: 3.8/--/0.5/--/53/17/17 (06-11-25)  PT/aPTT/INR: --/157.7/-- (06-11-25)  Thyroid Stimulating Hormone, Serum: 1.720 (06-11-25)  Total T4/Free T4: 7.64/-- (06-11-25)  CBC - WBC/HGB/HTC/PLT: 6.28/12.3/36.6/168 (06-11-25)BMP: Na/K/Cl/Bicarb/BUN/Cr/Gluc: 138/4.2/100/29/27/1.07/87 (06-11-25)  Anion Gap: 9 (06-11-25)  eGFR: 76 (06-11-25)  Calcium: 9.0 (06-11-25)  Phosphorus: -- (06-11-25)  Magnesium: 2.0 (06-11-25)    CAPILLARY BLOOD GLUCOSE & INSULIN RECEIVED  181 mg/dL (06-10 @ 21:50) lantus 10 + 0   63 mg/dL (06-11 @ 07:44) X  95 mg/dL (06-11 @ 08:35) X  90 mg/dL (06-11 @ 12:02) X NPO for cath.       06-10-25 @ 07:01  -  06-11-25 @ 07:00  --------------------------------------------------------  IN: 72 mL / OUT: 750 mL / NET: -678 mL    06-11-25 @ 07:01  -  06-11-25 @ 13:49  --------------------------------------------------------  IN: 0 mL / OUT: 300 mL / NET: -300 mL      ASSESSMENT / RECOMMENDATIONS  REBECA PRUITT is a 70yoM, Kazakh/Englishspeaking, FamHx ?MI (mother and father, passed away at 72) w/ PMHx HLD, IDDM, severe 2vCAD, HFmrEF (EF 45%), LV apical thrombus (on Eliquis last dose 6/10/25 AM) who presented to outpatient cardiologist Dr. Salmeron for a second opinion regarding his known severe CAD. Patient endorses worsening right sided chest pain for the past month. Admitted of /[facility] with Dr. Salmeron. Endocrinology consulted for diabetes management. He has hypoglycemic this am after receiving lantus 10 yesterday evening, npo today and then hypoglycemic this afternoon at bedside suspect also due to lantus being on board. Given his home am FS were also low, suspect that he needs very little basal insulin. He was given dextrose with improvement and started on dextrose prior to cath.     Home:     A1C: 10.0 %  Creatinine: 1.07, GFR: 76  Weight: 59, BMI: 21.6    #Uncontrolled Type 2 diabetes mellitus with hyperglycemia  - No lantus  - D5NS at 60cc/hr until 10pm (lantus is usually 24hrs)   - Start lispro 4 units before meals   - Continue lispro moderate dose sliding scale before meals and at bedtime.  - Patient's fingerstick glucose goal is 100-180 mg/dL.    - Discharge recommendations TBD: He is not on any secretogogue at home to help with post-meal hyperglycemia. Will consider metformin +/- DDP-4 vs sulfonylurea vs lispro.   - will continue to follow     Case discussed with Dr. Dumont. Primary team updated.       Lalita Hernandez   Endocrinology Fellow    Service Pager: [phone]

## 2025-06-11 NOTE — PATIENT PROFILE ADULT - FALL HARM RISK - HARM RISK INTERVENTIONS

## 2025-06-11 NOTE — PROGRESS NOTE ADULT - PROBLEM SELECTOR PLAN 1
- LHC (5/9/25): p/mRCA 95%, mRCA 40-50%, 100% RPDA,  short LM, oLAD 95%, oLCx 30%, dLCx 70%, 100% mLAD, no EDP. LV Thrombus.   - RHC (5/9/25): RA 0, PA 21/2/10, CO/CI 4.8/2.5.  - Antianginal: continue home Ranexa 500mg PO BID, Imdur 60mg daily  - on Aspirin and Eliquis at home - LHC (5/9/25): p/mRCA 95%, mRCA 40-50%, 100% RPDA,  short LM, oLAD 95%, oLCx 30%, dLCx 70%, 100% mLAD, no EDP. LV Thrombus.   - RHC (5/9/25): RA 0, PA 21/2/10, CO/CI 4.8/2.5.  - TTE 6/11: EF 47%. Mild LVH. Regional wall motion abnormalities present. Multiple segmental abnormalities exist. Left ventricular thrombus measuring 5 mm x 6 mm x 7 mm visualized at left ventricular apex. Grade I DD. Normal RV size and systolic function  - Antianginal: continue home Ranexa 500mg PO BID, Imdur 60mg daily   - on Aspirin and Eliquis at home, currently on Heparin gtt full AC for LHC/RHC.   - s/p Plavix load 600 mg PO x1.    - Plan for LHC/RHC today with Dr. Salmeron, 6/11/2025.

## 2025-06-11 NOTE — PROGRESS NOTE ADULT - PROBLEM SELECTOR PLAN 5
continue home Lipitor 80mg daily  - cholesterol 125. Triglycerides 54. HDL 43. LDL 70.       F: None  E: Replete if K<4 or Mag<2  N: DASH Diet  GIppx: Pantoprazole  VTEppx: Heparin gtt  Dispo: after revascularization  PT: not indicate.

## 2025-06-11 NOTE — PROGRESS NOTE ADULT - SUBJECTIVE AND OBJECTIVE BOX
Cardiology NP  Adult Progress Note      ****INCOMPLETE       SUBJECTIVE ASSESSMENT: Patient is seen and examined at the bedside. Patient has no complaints at this time. Patient denies chest pain, chest pressure, SOB/CISSE, N/V/D, and fevers/chills.   	  MEDICATIONS:  isosorbide   mononitrate ER Tablet (IMDUR) 60 milliGRAM(s) Oral daily  metoprolol succinate ER 25 milliGRAM(s) Oral daily  ranolazine 500 milliGRAM(s) Oral two times a day  valsartan 40 milliGRAM(s) Oral daily  gabapentin 600 milliGRAM(s) Oral three times a day  pantoprazole    Tablet 40 milliGRAM(s) Oral before breakfast  atorvastatin 80 milliGRAM(s) Oral at bedtime  dapagliflozin 10 milliGRAM(s) Oral daily  dextrose 50% Injectable 25 Gram(s) IV Push once  dextrose 50% Injectable 12.5 Gram(s) IV Push once  dextrose 50% Injectable 25 Gram(s) IV Push once  dextrose Oral Gel 15 Gram(s) Oral once PRN  glucagon  Injectable 1 milliGRAM(s) IntraMuscular once  insulin glargine Injectable (LANTUS) 10 Unit(s) SubCutaneous at bedtime  insulin lispro (ADMELOG) corrective regimen sliding scale   SubCutaneous three times a day before meals  insulin lispro (ADMELOG) corrective regimen sliding scale   SubCutaneous at bedtime  aspirin enteric coated 81 milliGRAM(s) Oral daily  clopidogrel Tablet 600 milliGRAM(s) Oral once  dextrose 5%. 1000 milliLiter(s) IV Continuous <Continuous>  dextrose 5%. 1000 milliLiter(s) IV Continuous <Continuous>  heparin   Injectable 4500 Unit(s) IV Push every 6 hours PRN  heparin   Injectable 2000 Unit(s) IV Push every 6 hours PRN  heparin  Infusion.  Unit(s)/Hr IV Continuous <Continuous>  	  VITAL SIGNS:  T(C): 36.8 (06-11-25 @ 05:15), Max: 36.8 (06-10-25 @ 18:06)  HR: 60 (06-11-25 @ 09:19) (58 - 67)  BP: 141/65 (06-11-25 @ 09:19) (125/66 - 150/82)  RR: 17 (06-11-25 @ 09:19) (16 - 20)  SpO2: 97% (06-11-25 @ 09:19) (96% - 98%)  Wt(kg): --    I&O's Summary    10 Rufus 2025 07:01  -  11 Jun 2025 07:00  --------------------------------------------------------  IN: 72 mL / OUT: 750 mL / NET: -678 mL    Weight (kg): 59 (06-10 @ 17:07)                                     PHYSICAL EXAM:  Appearance: Normal	  HEENT: Normal oral mucosa, PERRL, EOMI	  Neck: Supple, - JVD  Cardiovascular: Normal S1 S2, No murmurs  Respiratory: Lungs clear to auscultation/No Rales, Rhonchi, Wheezing	  Gastrointestinal:  Soft, Non-tender, + BS	  Skin: No rashes, No ecchymoses, No cyanosis  Extremities: Normal range of motion, No clubbing, cyanosis or edema  Vascular: Peripheral pulses palpable 2+ bilaterally  Neurologic: Non-focal  Psychiatry: A & O x 3, Mood & affect appropriate    LABS:	 	             12.3   6.28  )-----------( 168      ( 11 Jun 2025 05:40 )             36.6     06-11    138  |  100  |  27[H]  ----------------------------<  87  4.2   |  29  |  1.07    Ca    9.0      11 Jun 2025 03:53  Mg     2.0     06-11  TPro  6.7  /  Alb  3.8  /  TBili  0.5  /  DBili  x   /  AST  17  /  ALT  17  /  AlkPhos  53  06-11  proBNP:   Lipid Profile:   HgA1c: 10.0%   TSH: Thyroid Stimulating Hormone, Serum: 1.720 uIU/mL (06-11 @ 03:53)  PT/INR - ( 11 Jun 2025 03:53 )   PT: 13.5 sec;   INR: 1.18     PTT - ( 11 Jun 2025 03:53 )  PTT:171.3 sec Cardiology NP  Adult Progress Note      ****INCOMPLETE       SUBJECTIVE ASSESSMENT: Patient is seen and examined at the bedside. Patient has no complaints at this time. Patient denies chest pain, chest pressure, SOB/CISSE, N/V/D, and fevers/chills.   	  MEDICATIONS:  isosorbide   mononitrate ER Tablet (IMDUR) 60 milliGRAM(s) Oral daily  metoprolol succinate ER 25 milliGRAM(s) Oral daily  ranolazine 500 milliGRAM(s) Oral two times a day  valsartan 40 milliGRAM(s) Oral daily  gabapentin 600 milliGRAM(s) Oral three times a day  pantoprazole    Tablet 40 milliGRAM(s) Oral before breakfast  atorvastatin 80 milliGRAM(s) Oral at bedtime  dapagliflozin 10 milliGRAM(s) Oral daily  dextrose 50% Injectable 25 Gram(s) IV Push once  dextrose 50% Injectable 12.5 Gram(s) IV Push once  dextrose 50% Injectable 25 Gram(s) IV Push once  dextrose Oral Gel 15 Gram(s) Oral once PRN  glucagon  Injectable 1 milliGRAM(s) IntraMuscular once  insulin glargine Injectable (LANTUS) 10 Unit(s) SubCutaneous at bedtime  insulin lispro (ADMELOG) corrective regimen sliding scale   SubCutaneous three times a day before meals  insulin lispro (ADMELOG) corrective regimen sliding scale   SubCutaneous at bedtime  aspirin enteric coated 81 milliGRAM(s) Oral daily  clopidogrel Tablet 600 milliGRAM(s) Oral once  dextrose 5%. 1000 milliLiter(s) IV Continuous <Continuous>  dextrose 5%. 1000 milliLiter(s) IV Continuous <Continuous>  heparin   Injectable 4500 Unit(s) IV Push every 6 hours PRN  heparin   Injectable 2000 Unit(s) IV Push every 6 hours PRN  heparin  Infusion.  Unit(s)/Hr IV Continuous <Continuous>  	  VITAL SIGNS:  T(C): 36.8 (06-11-25 @ 05:15), Max: 36.8 (06-10-25 @ 18:06)  HR: 60 (06-11-25 @ 09:19) (58 - 67)  BP: 141/65 (06-11-25 @ 09:19) (125/66 - 150/82)  RR: 17 (06-11-25 @ 09:19) (16 - 20)  SpO2: 97% (06-11-25 @ 09:19) (96% - 98%)  Wt(kg): --    I&O's Summary    10 Rufus 2025 07:01  -  11 Jun 2025 07:00  --------------------------------------------------------  IN: 72 mL / OUT: 750 mL / NET: -678 mL    Weight (kg): 59 (06-10 @ 17:07)                                     PHYSICAL EXAM:  Appearance: Normal	  HEENT: Normal oral mucosa, PERRL, EOMI	  Neck: Supple, - JVD  Cardiovascular: Normal S1 S2, No murmurs  Respiratory: Lungs clear to auscultation/No Rales, Rhonchi, Wheezing	  Gastrointestinal:  Soft, Non-tender, + BS	  Skin: No rashes, No ecchymoses, No cyanosis  Extremities: Normal range of motion, No clubbing, cyanosis or edema  Vascular: Radial pulses +2 bilaterally, DP pulses +2 bilaterally, TP pulses +2 bilaterally. Femoral pulse +2 bilaterally.   Neurologic: Non-focal  Psychiatry: A & O x 3, Mood & affect appropriate    LABS:	 	             12.3   6.28  )-----------( 168      ( 11 Jun 2025 05:40 )             36.6     06-11    138  |  100  |  27[H]  ----------------------------<  87  4.2   |  29  |  1.07    Ca    9.0      11 Jun 2025 03:53  Mg     2.0     06-11  TPro  6.7  /  Alb  3.8  /  TBili  0.5  /  DBili  x   /  AST  17  /  ALT  17  /  AlkPhos  53  06-11  proBNP:   Lipid Profile:   HgA1c: 10.0%   TSH: Thyroid Stimulating Hormone, Serum: 1.720 uIU/mL (06-11 @ 03:53)  PT/INR - ( 11 Jun 2025 03:53 )   PT: 13.5 sec;   INR: 1.18     PTT - ( 11 Jun 2025 03:53 )  PTT:171.3 sec Cardiology NP  Adult Progress Note    SUBJECTIVE ASSESSMENT: Patient is seen and examined at the bedside. Patient has no complaints at this time. Patient denies chest pain, chest pressure, SOB/CISSE, N/V/D, and fevers/chills.   	  MEDICATIONS:  isosorbide   mononitrate ER Tablet (IMDUR) 60 milliGRAM(s) Oral daily  metoprolol succinate ER 25 milliGRAM(s) Oral daily  ranolazine 500 milliGRAM(s) Oral two times a day  valsartan 40 milliGRAM(s) Oral daily  gabapentin 600 milliGRAM(s) Oral three times a day  pantoprazole    Tablet 40 milliGRAM(s) Oral before breakfast  atorvastatin 80 milliGRAM(s) Oral at bedtime  dapagliflozin 10 milliGRAM(s) Oral daily  dextrose 50% Injectable 25 Gram(s) IV Push once  dextrose 50% Injectable 12.5 Gram(s) IV Push once  dextrose 50% Injectable 25 Gram(s) IV Push once  dextrose Oral Gel 15 Gram(s) Oral once PRN  glucagon  Injectable 1 milliGRAM(s) IntraMuscular once  insulin glargine Injectable (LANTUS) 10 Unit(s) SubCutaneous at bedtime  insulin lispro (ADMELOG) corrective regimen sliding scale   SubCutaneous three times a day before meals  insulin lispro (ADMELOG) corrective regimen sliding scale   SubCutaneous at bedtime  aspirin enteric coated 81 milliGRAM(s) Oral daily  clopidogrel Tablet 600 milliGRAM(s) Oral once  dextrose 5%. 1000 milliLiter(s) IV Continuous <Continuous>  dextrose 5%. 1000 milliLiter(s) IV Continuous <Continuous>  heparin   Injectable 4500 Unit(s) IV Push every 6 hours PRN  heparin   Injectable 2000 Unit(s) IV Push every 6 hours PRN  heparin  Infusion.  Unit(s)/Hr IV Continuous <Continuous>  	  VITAL SIGNS:  T(C): 36.8 (06-11-25 @ 05:15), Max: 36.8 (06-10-25 @ 18:06)  HR: 60 (06-11-25 @ 09:19) (58 - 67)  BP: 141/65 (06-11-25 @ 09:19) (125/66 - 150/82)  RR: 17 (06-11-25 @ 09:19) (16 - 20)  SpO2: 97% (06-11-25 @ 09:19) (96% - 98%)  Wt(kg): --    I&O's Summary    10 Rufus 2025 07:01  -  11 Jun 2025 07:00  --------------------------------------------------------  IN: 72 mL / OUT: 750 mL / NET: -678 mL    Weight (kg): 59 (06-10 @ 17:07)                                     PHYSICAL EXAM:  Appearance: Normal	  HEENT: Normal oral mucosa, PERRL, EOMI	  Neck: Supple, - JVD  Cardiovascular: Normal S1 S2, No murmurs  Respiratory: Lungs clear to auscultation/No Rales, Rhonchi, Wheezing	  Gastrointestinal:  Soft, Non-tender, + BS	  Skin: No rashes, No ecchymoses, No cyanosis  Extremities: Normal range of motion, No clubbing, cyanosis or edema  Vascular: Radial pulses +2 bilaterally, DP pulses +2 bilaterally, TP pulses +2 bilaterally. Femoral pulse +2 bilaterally.   Neurologic: Non-focal  Psychiatry: A & O x 3, Mood & affect appropriate    LABS:	 	             12.3   6.28  )-----------( 168      ( 11 Jun 2025 05:40 )             36.6     06-11    138  |  100  |  27[H]  ----------------------------<  87  4.2   |  29  |  1.07    Ca    9.0      11 Jun 2025 03:53  Mg     2.0     06-11  TPro  6.7  /  Alb  3.8  /  TBili  0.5  /  DBili  x   /  AST  17  /  ALT  17  /  AlkPhos  53  06-11  proBNP:   Lipid Profile:   HgA1c: 10.0%   TSH: Thyroid Stimulating Hormone, Serum: 1.720 uIU/mL (06-11 @ 03:53)  PT/INR - ( 11 Jun 2025 03:53 )   PT: 13.5 sec;   INR: 1.18     PTT - ( 11 Jun 2025 03:53 )  PTT:171.3 sec

## 2025-06-11 NOTE — PROGRESS NOTE ADULT - PROBLEM SELECTOR PLAN 2
Continue atorvastatin at 20 mg p.o. daily. - on Aspirin 81mg daily and Eliquis 5mg BID   - continue with heparin gtt full AC. Transition to eliquis 5 mg BID PO once no further planned procedures - on Aspirin 81mg daily and Eliquis 5mg BID   - continue with heparin gtt full AC. Transition to Eliquis 5 mg BID PO once no further planned procedures  - TTE 6/11: EF 47%. Mild LVH. Regional wall motion abnormalities present. Multiple segmental abnormalities exist. Left ventricular thrombus measuring 5 mm x 6 mm x 7 mm visualized at left ventricular apex. Grade I DD. Normal RV size and systolic function

## 2025-06-11 NOTE — PROGRESS NOTE ADULT - NS ATTEND AMEND GEN_ALL_CORE FT
MR. Falk is a 70M with history of DM, HFmrEF, LV thrombus presented as second opinion for severe CAD in setting of chest pain, given persistent chest pain sent to ER and admitted for unstable angina.     Exam:   NAD  JVP normal  CTA b/l  S1, S2 RRR  WWP, no edema      Unstable angina- Heparin gtt, asa/plavix, statin ARB+ BB, on antianginal- imdur+ ranexa.   HFmrEF- euvolemic, repeat TTE, likely etiology ischemic in setting of known CAD  LV thrombus- heparin gtt  DM- A1c 10, endocrine consult, statin + ARB

## 2025-06-12 ENCOUNTER — TRANSCRIPTION ENCOUNTER (OUTPATIENT)
Age: 70
End: 2025-06-12

## 2025-06-12 VITALS
OXYGEN SATURATION: 96 % | RESPIRATION RATE: 18 BRPM | DIASTOLIC BLOOD PRESSURE: 56 MMHG | TEMPERATURE: 98 F | HEART RATE: 60 BPM | SYSTOLIC BLOOD PRESSURE: 97 MMHG

## 2025-06-12 LAB
ALBUMIN SERPL ELPH-MCNC: 3.5 G/DL — SIGNIFICANT CHANGE UP (ref 3.3–5)
ALP SERPL-CCNC: 54 U/L — SIGNIFICANT CHANGE UP (ref 40–120)
ALT FLD-CCNC: 17 U/L — SIGNIFICANT CHANGE UP (ref 10–45)
ANION GAP SERPL CALC-SCNC: 11 MMOL/L — SIGNIFICANT CHANGE UP (ref 5–17)
AST SERPL-CCNC: 17 U/L — SIGNIFICANT CHANGE UP (ref 10–40)
BILIRUB SERPL-MCNC: 0.5 MG/DL — SIGNIFICANT CHANGE UP (ref 0.2–1.2)
BUN SERPL-MCNC: 26 MG/DL — HIGH (ref 7–23)
CALCIUM SERPL-MCNC: 8.7 MG/DL — SIGNIFICANT CHANGE UP (ref 8.4–10.5)
CHLORIDE SERPL-SCNC: 100 MMOL/L — SIGNIFICANT CHANGE UP (ref 96–108)
CO2 SERPL-SCNC: 22 MMOL/L — SIGNIFICANT CHANGE UP (ref 22–31)
CREAT SERPL-MCNC: 1.34 MG/DL — HIGH (ref 0.5–1.3)
EGFR: 58 ML/MIN/1.73M2 — LOW
EGFR: 58 ML/MIN/1.73M2 — LOW
GLUCOSE SERPL-MCNC: 222 MG/DL — HIGH (ref 70–99)
HCT VFR BLD CALC: 36.9 % — LOW (ref 39–50)
HGB BLD-MCNC: 12.9 G/DL — LOW (ref 13–17)
MAGNESIUM SERPL-MCNC: 2.1 MG/DL — SIGNIFICANT CHANGE UP (ref 1.6–2.6)
MCHC RBC-ENTMCNC: 30 PG — SIGNIFICANT CHANGE UP (ref 27–34)
MCHC RBC-ENTMCNC: 35 G/DL — SIGNIFICANT CHANGE UP (ref 32–36)
MCV RBC AUTO: 85.8 FL — SIGNIFICANT CHANGE UP (ref 80–100)
NRBC BLD AUTO-RTO: 0 /100 WBCS — SIGNIFICANT CHANGE UP (ref 0–0)
PLATELET # BLD AUTO: 163 K/UL — SIGNIFICANT CHANGE UP (ref 150–400)
POTASSIUM SERPL-MCNC: 4.2 MMOL/L — SIGNIFICANT CHANGE UP (ref 3.5–5.3)
POTASSIUM SERPL-SCNC: 4.2 MMOL/L — SIGNIFICANT CHANGE UP (ref 3.5–5.3)
PROT SERPL-MCNC: 6.6 G/DL — SIGNIFICANT CHANGE UP (ref 6–8.3)
RBC # BLD: 4.3 M/UL — SIGNIFICANT CHANGE UP (ref 4.2–5.8)
RBC # FLD: 16.1 % — HIGH (ref 10.3–14.5)
SODIUM SERPL-SCNC: 133 MMOL/L — LOW (ref 135–145)
WBC # BLD: 7.33 K/UL — SIGNIFICANT CHANGE UP (ref 3.8–10.5)
WBC # FLD AUTO: 7.33 K/UL — SIGNIFICANT CHANGE UP (ref 3.8–10.5)

## 2025-06-12 PROCEDURE — 86900 BLOOD TYPING SEROLOGIC ABO: CPT

## 2025-06-12 PROCEDURE — 86901 BLOOD TYPING SEROLOGIC RH(D): CPT

## 2025-06-12 PROCEDURE — 85025 COMPLETE CBC W/AUTO DIFF WBC: CPT

## 2025-06-12 PROCEDURE — 83550 IRON BINDING TEST: CPT

## 2025-06-12 PROCEDURE — C8929: CPT

## 2025-06-12 PROCEDURE — C1874: CPT

## 2025-06-12 PROCEDURE — 80053 COMPREHEN METABOLIC PANEL: CPT

## 2025-06-12 PROCEDURE — 85347 COAGULATION TIME ACTIVATED: CPT

## 2025-06-12 PROCEDURE — 85610 PROTHROMBIN TIME: CPT

## 2025-06-12 PROCEDURE — 82553 CREATINE MB FRACTION: CPT

## 2025-06-12 PROCEDURE — 85027 COMPLETE CBC AUTOMATED: CPT

## 2025-06-12 PROCEDURE — 93005 ELECTROCARDIOGRAM TRACING: CPT

## 2025-06-12 PROCEDURE — 36415 COLL VENOUS BLD VENIPUNCTURE: CPT

## 2025-06-12 PROCEDURE — 99285 EMERGENCY DEPT VISIT HI MDM: CPT | Mod: 25

## 2025-06-12 PROCEDURE — C1887: CPT

## 2025-06-12 PROCEDURE — C1894: CPT

## 2025-06-12 PROCEDURE — 80061 LIPID PANEL: CPT

## 2025-06-12 PROCEDURE — 84436 ASSAY OF TOTAL THYROXINE: CPT

## 2025-06-12 PROCEDURE — 82962 GLUCOSE BLOOD TEST: CPT

## 2025-06-12 PROCEDURE — 80048 BASIC METABOLIC PNL TOTAL CA: CPT

## 2025-06-12 PROCEDURE — 83036 HEMOGLOBIN GLYCOSYLATED A1C: CPT

## 2025-06-12 PROCEDURE — 82550 ASSAY OF CK (CPK): CPT

## 2025-06-12 PROCEDURE — 83735 ASSAY OF MAGNESIUM: CPT

## 2025-06-12 PROCEDURE — 84484 ASSAY OF TROPONIN QUANT: CPT

## 2025-06-12 PROCEDURE — 86850 RBC ANTIBODY SCREEN: CPT

## 2025-06-12 PROCEDURE — 83540 ASSAY OF IRON: CPT

## 2025-06-12 PROCEDURE — C1753: CPT

## 2025-06-12 PROCEDURE — C1724: CPT

## 2025-06-12 PROCEDURE — 83880 ASSAY OF NATRIURETIC PEPTIDE: CPT

## 2025-06-12 PROCEDURE — C1725: CPT

## 2025-06-12 PROCEDURE — 99239 HOSP IP/OBS DSCHRG MGMT >30: CPT

## 2025-06-12 PROCEDURE — C1769: CPT

## 2025-06-12 PROCEDURE — 84466 ASSAY OF TRANSFERRIN: CPT

## 2025-06-12 PROCEDURE — 84443 ASSAY THYROID STIM HORMONE: CPT

## 2025-06-12 PROCEDURE — 82728 ASSAY OF FERRITIN: CPT

## 2025-06-12 PROCEDURE — 85730 THROMBOPLASTIN TIME PARTIAL: CPT

## 2025-06-12 RX ORDER — ASPIRIN 325 MG
1 TABLET ORAL
Qty: 7 | Refills: 0
Start: 2025-06-12 | End: 2025-06-18

## 2025-06-12 RX ORDER — APIXABAN 2.5 MG/1
1 TABLET, FILM COATED ORAL
Qty: 60 | Refills: 0
Start: 2025-06-12 | End: 2025-07-11

## 2025-06-12 RX ORDER — INSULIN GLARGINE-YFGN 100 [IU]/ML
4 INJECTION, SOLUTION SUBCUTANEOUS
Qty: 1 | Refills: 0
Start: 2025-06-12 | End: 2025-07-11

## 2025-06-12 RX ORDER — METOPROLOL SUCCINATE 50 MG/1
1 TABLET, EXTENDED RELEASE ORAL
Qty: 30 | Refills: 0
Start: 2025-06-12 | End: 2025-07-11

## 2025-06-12 RX ORDER — GABAPENTIN 400 MG/1
1 CAPSULE ORAL
Qty: 90 | Refills: 0
Start: 2025-06-12 | End: 2025-07-11

## 2025-06-12 RX ORDER — CLOPIDOGREL BISULFATE 75 MG/1
1 TABLET, FILM COATED ORAL
Qty: 30 | Refills: 0
Start: 2025-06-12 | End: 2025-07-11

## 2025-06-12 RX ORDER — ATORVASTATIN CALCIUM 80 MG/1
1 TABLET, FILM COATED ORAL
Qty: 30 | Refills: 0
Start: 2025-06-12 | End: 2025-07-11

## 2025-06-12 RX ORDER — DAPAGLIFLOZIN 5 MG/1
1 TABLET, FILM COATED ORAL
Qty: 30 | Refills: 0
Start: 2025-06-12 | End: 2025-07-11

## 2025-06-12 RX ORDER — ASPIRIN 325 MG
1 TABLET ORAL
Refills: 0 | DISCHARGE

## 2025-06-12 RX ORDER — METOPROLOL SUCCINATE 50 MG/1
1 TABLET, EXTENDED RELEASE ORAL
Refills: 0 | DISCHARGE

## 2025-06-12 RX ORDER — METFORMIN HYDROCHLORIDE 850 MG/1
1 TABLET ORAL
Refills: 0 | DISCHARGE

## 2025-06-12 RX ORDER — RANOLAZINE 1000 MG/1
1 TABLET, FILM COATED, EXTENDED RELEASE ORAL
Refills: 0 | DISCHARGE

## 2025-06-12 RX ORDER — INSULIN GLARGINE-YFGN 100 [IU]/ML
6 INJECTION, SOLUTION SUBCUTANEOUS
Qty: 1 | Refills: 0
Start: 2025-06-12 | End: 2025-07-11

## 2025-06-12 RX ORDER — DAPAGLIFLOZIN 5 MG/1
1 TABLET, FILM COATED ORAL
Refills: 0 | DISCHARGE

## 2025-06-12 RX ORDER — ISOSORBIDE MONONITRATE 60 MG/1
1 TABLET, EXTENDED RELEASE ORAL
Refills: 0 | DISCHARGE

## 2025-06-12 RX ORDER — GABAPENTIN 400 MG/1
1 CAPSULE ORAL
Refills: 0 | DISCHARGE

## 2025-06-12 RX ORDER — APIXABAN 2.5 MG/1
1 TABLET, FILM COATED ORAL
Refills: 0 | DISCHARGE

## 2025-06-12 RX ORDER — APIXABAN 2.5 MG/1
5 TABLET, FILM COATED ORAL
Refills: 0 | Status: DISCONTINUED | OUTPATIENT
Start: 2025-06-12 | End: 2025-06-12

## 2025-06-12 RX ORDER — RANOLAZINE 1000 MG/1
1 TABLET, FILM COATED, EXTENDED RELEASE ORAL
Qty: 60 | Refills: 0
Start: 2025-06-12 | End: 2025-07-11

## 2025-06-12 RX ORDER — METFORMIN HYDROCHLORIDE 850 MG/1
1 TABLET ORAL
Qty: 60 | Refills: 0
Start: 2025-06-12 | End: 2025-07-11

## 2025-06-12 RX ORDER — INSULIN GLARGINE-YFGN 100 [IU]/ML
20 INJECTION, SOLUTION SUBCUTANEOUS
Refills: 0 | DISCHARGE

## 2025-06-12 RX ORDER — ATORVASTATIN CALCIUM 80 MG/1
1 TABLET, FILM COATED ORAL
Refills: 0 | DISCHARGE

## 2025-06-12 RX ORDER — ISOSORBIDE MONONITRATE 60 MG/1
1 TABLET, EXTENDED RELEASE ORAL
Qty: 30 | Refills: 0
Start: 2025-06-12 | End: 2025-07-11

## 2025-06-12 RX ORDER — SITAGLIPTIN 100 MG/1
1 TABLET, FILM COATED ORAL
Qty: 30 | Refills: 0
Start: 2025-06-12 | End: 2025-07-11

## 2025-06-12 RX ORDER — ASPIRIN 325 MG
1 TABLET ORAL
Qty: 30 | Refills: 0
Start: 2025-06-12 | End: 2025-07-11

## 2025-06-12 RX ADMIN — GABAPENTIN 600 MILLIGRAM(S): 400 CAPSULE ORAL at 13:25

## 2025-06-12 RX ADMIN — INSULIN LISPRO 2: 100 INJECTION, SOLUTION INTRAVENOUS; SUBCUTANEOUS at 08:25

## 2025-06-12 RX ADMIN — CLOPIDOGREL BISULFATE 75 MILLIGRAM(S): 75 TABLET, FILM COATED ORAL at 10:44

## 2025-06-12 RX ADMIN — INSULIN LISPRO 4 UNIT(S): 100 INJECTION, SOLUTION INTRAVENOUS; SUBCUTANEOUS at 12:18

## 2025-06-12 RX ADMIN — DAPAGLIFLOZIN 10 MILLIGRAM(S): 5 TABLET, FILM COATED ORAL at 10:44

## 2025-06-12 RX ADMIN — INSULIN LISPRO 4 UNIT(S): 100 INJECTION, SOLUTION INTRAVENOUS; SUBCUTANEOUS at 08:25

## 2025-06-12 RX ADMIN — RANOLAZINE 500 MILLIGRAM(S): 1000 TABLET, FILM COATED, EXTENDED RELEASE ORAL at 05:19

## 2025-06-12 RX ADMIN — INSULIN LISPRO 1: 100 INJECTION, SOLUTION INTRAVENOUS; SUBCUTANEOUS at 12:18

## 2025-06-12 RX ADMIN — Medication 81 MILLIGRAM(S): at 10:44

## 2025-06-12 RX ADMIN — Medication 40 MILLIGRAM(S): at 05:19

## 2025-06-12 RX ADMIN — Medication 1000 MILLILITER(S): at 10:44

## 2025-06-12 RX ADMIN — GABAPENTIN 600 MILLIGRAM(S): 400 CAPSULE ORAL at 05:19

## 2025-06-12 NOTE — PROGRESS NOTE ADULT - SUBJECTIVE AND OBJECTIVE BOX
SUBJECTIVE / INTERVAL HPI: Patient was seen and examined this morning.     Overnight events:    CAPILLARY BLOOD GLUCOSE & INSULIN RECEIVED  181 mg/dL (06-10 @ 21:50)  63 mg/dL (06-11 @ 07:44)  95 mg/dL (06-11 @ 08:35)  90 mg/dL (06-11 @ 12:02)  68 mg/dL (06-11 @ 16:02)  119 mg/dL (06-11 @ 16:26)  245 mg/dL (06-11 @ 20:23)  206 mg/dL (06-11 @ 21:49)  204 mg/dL (06-12 @ 07:28)      REVIEW OF SYSTEMS  Constitutional:  Negative fever, chills   Cardiovascular:  Negative for chest pain or palpitations.  Respiratory:  Negative for cough, wheezing, or shortness of breath.    Gastrointestinal:  Negative for nausea, vomiting, diarrhea, constipation, or abdominal pain.  Genitourinary:  Negative frequency, urgency or dysuria.  Neurologic:  No headache, confusion, dizziness, lightheadedness.    PHYSICAL EXAM  Vital Signs Last 24 Hrs  T(C): 36.9 (12 Jun 2025 05:13), Max: 36.9 (11 Jun 2025 21:39)  T(F): 98.4 (12 Jun 2025 05:13), Max: 98.4 (11 Jun 2025 21:39)  HR: 71 (12 Jun 2025 05:13) (60 - 74)  BP: 103/55 (12 Jun 2025 05:13) (100/56 - 141/65)  BP(mean): 72 (12 Jun 2025 05:13) (70 - 93)  RR: 18 (12 Jun 2025 05:13) (17 - 18)  SpO2: 98% (12 Jun 2025 05:13) (97% - 98%)    Parameters below as of 12 Jun 2025 05:13  Patient On (Oxygen Delivery Method): room air        Constitutional: Awake, alert, in no acute distress.   HEENT: Normocephalic, atraumatic, TOAN.  Respiratory: Lungs clear to ausculation bilaterally.   Cardiovascular: regular rhythm, normal S1 and S2, no audible murmurs.   GI: soft, non-tender, non-distended, bowel sounds present.  Extremities: No lower extremity edema.     LABS  CBC - WBC/HGB/HTC/PLT: 7.33/12.9/36.9/163 (06-12-25)  BMP - Na/K/Cl/Bicarb/BUN/Cr/Gluc/AG/eGFR: 133/4.2/100/22/26/1.34/222/11/58 (06-12-25)  Ca - 8.7 (06-12-25)  Phos - -- (06-12-25)  Mg - 2.1 (06-12-25)  LFT - Alb/Tprot/Tbili/Dbili/AlkPhos/ALT/AST: 3.5/--/0.5/--/54/17/17 (06-12-25)  PT/aPTT/INR: --/68.3/-- (06-11-25)   Thyroid Stimulating Hormone, Serum: 1.720 (06-11-25)  Total T4/Free T4: 7.64/-- (06-11-25)      06-11-25 @ 07:01  -  06-12-25 @ 07:00  --------------------------------------------------------  IN: 0 mL / OUT: 750 mL / NET: -750 mL        MEDICATIONS  MEDICATIONS  (STANDING):  apixaban 5 milliGRAM(s) Oral two times a day  aspirin enteric coated 81 milliGRAM(s) Oral daily  atorvastatin 80 milliGRAM(s) Oral at bedtime  clopidogrel Tablet 75 milliGRAM(s) Oral daily  dapagliflozin 10 milliGRAM(s) Oral daily  dextrose 5%. 1000 milliLiter(s) (50 mL/Hr) IV Continuous <Continuous>  dextrose 5%. 1000 milliLiter(s) (100 mL/Hr) IV Continuous <Continuous>  dextrose 5%. 1000 milliLiter(s) (50 mL/Hr) IV Continuous <Continuous>  dextrose 5%. 1000 milliLiter(s) (100 mL/Hr) IV Continuous <Continuous>  dextrose 50% Injectable 12.5 Gram(s) IV Push once  dextrose 50% Injectable 25 Gram(s) IV Push once  dextrose 50% Injectable 12.5 Gram(s) IV Push once  dextrose 50% Injectable 25 Gram(s) IV Push once  dextrose 50% Injectable 12.5 Gram(s) IV Push once  dextrose 50% Injectable 25 Gram(s) IV Push once  dextrose 50% Injectable 25 Gram(s) IV Push once  gabapentin 600 milliGRAM(s) Oral three times a day  glucagon  Injectable 1 milliGRAM(s) IntraMuscular once  glucagon  Injectable 1 milliGRAM(s) IntraMuscular once  insulin lispro (ADMELOG) corrective regimen sliding scale   SubCutaneous three times a day before meals  insulin lispro (ADMELOG) corrective regimen sliding scale   SubCutaneous at bedtime  insulin lispro Injectable (ADMELOG) 4 Unit(s) SubCutaneous three times a day before meals  isosorbide   mononitrate ER Tablet (IMDUR) 60 milliGRAM(s) Oral daily  metoprolol succinate ER 25 milliGRAM(s) Oral daily  pantoprazole    Tablet 40 milliGRAM(s) Oral before breakfast  ranolazine 500 milliGRAM(s) Oral two times a day  sodium chloride 0.9%. 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 Salmeron for a second opinion regarding his known severe CAD. Patient endorses worsening right sided chest pain for the past month. Admitted of /[facility] with Dr. Salmeron. Endocrinology consulted for diabetes management. Home:     A1C: 10.0 %  Creatinine: 1.34   Weight: 59, BMI: 21.6    #Uncontrolled Type 2 diabetes mellitus with hyperglycemia  - Lantus___   - lispro 4 units before meals   - carb consistent diet  - Continue lispro moderate dose sliding scale before meals and at bedtime.  - Patient's fingerstick glucose goal is 100-180 mg/dL.    - Discharge recommendations TBD: He is not on any secretogogue at home to help with post-meal hyperglycemia. Will consider metformin +/- DDP-4 vs sulfonylurea vs lispro.   - will continue to follow     Case discussed with Dr. Dumont. Primary team updated.       Lalita Hernandez   Endocrinology Fellow    Service Pager: [phone]

## 2025-06-12 NOTE — DISCHARGE NOTE PROVIDER - NSDCFUSCHEDAPPT_GEN_ALL_CORE_FT
Cristian Salmeron  [facility] Physician [facility]  [facility] [address]  Scheduled Appointment: 06/17/2025

## 2025-06-12 NOTE — PROGRESS NOTE ADULT - PROBLEM SELECTOR PLAN 1
- LHC (5/9/25): p/mRCA 95%, mRCA 40-50%, 100% RPDA,  short LM, oLAD 95%, oLCx 30%, dLCx 70%, 100% mLAD, no EDP. LV Thrombus.   - RHC (5/9/25): RA 0, PA 21/2/10, CO/CI 4.8/2.5.  - TTE 6/11: EF 47%. Mild LVH. Regional wall motion abnormalities present. Multiple segmental abnormalities exist. Left ventricular thrombus measuring 5 mm x 6 mm x 7 mm visualized at left ventricular apex. Grade I DD. Normal RV size and systolic function  - Antianginal: continue home Ranexa 500mg PO BID, Imdur 60mg daily   - on Aspirin and Eliquis at home, currently on Heparin gtt full AC for LHC/RHC.   - s/p Plavix load 600 mg PO x1.    - Plan for LHC/RHC today with Dr. Salmeron, 6/11/2025.

## 2025-06-12 NOTE — DISCHARGE NOTE PROVIDER - ATTENDING DISCHARGE PHYSICAL EXAMINATION:
Mr. Falk is a 70M with history of DM, HFmrEF, LV thrombus presented as second opinion for severe CAD in setting of chest pain, given persistent chest pain sent to ER and admitted for unstable angina.     Exam:   NAD  JVP normal  CTA b/l  S1, S2 RRR  WWP, no edema      Unstable angina- Triple therapy for a week- drop aspirin. Eliquis for thrombus.   HFmrEF- euvolemic, repeat TTE, likely etiology ischemic in setting of known CAD, SGLT2, bb. SHY today, resume ARB as outpatient.  LV thrombus- Eliquis  DM- A1c 10, endocrine consult, statin + ARB.

## 2025-06-12 NOTE — DISCHARGE NOTE NURSING/CASE MANAGEMENT/SOCIAL WORK - FINANCIAL ASSISTANCE
[facility] provides services at a reduced cost to those who are determined to be eligible through [facility]’s financial assistance program. Information regarding [facility]’s financial assistance program can be found by going to [email or web address] or by calling [phone].

## 2025-06-12 NOTE — DISCHARGE NOTE PROVIDER - HOSPITAL COURSE
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 Salmeron for a second opinion regarding his known severe CAD and chest pain. Admitted to [facility] Cardiac Telemetry for stable angina and known severe CAD with plan for left heart catheterization. Pt underwent LHC (6/11/25) with Rota/CHRISTIN x2 p-dRCA; LM short, pLAD 99%, mLAD  (L-L collaterals), Power  (small vessel, L-L collaterals), mLCx 80% (small vessels), rPDA 80% diffuse (small vessel). RRA. Pt underwent TTE 6/11/25 revealing EF 47%, RWMA present, LV thrombus measuring 5 x 6 x 7 mm at LV apex (on eliquis). Pt is optimized on medical therapy with plan for discharge and close follow up with Dr. Salmeron. Plan to return for staging of LAD on Monday, July 14th.     #CAD  - LHC (6/11/25) with Rota/CHRISTIN x2 p-dRCA; LM short, pLAD 99%, mLAD  (L-L collaterals), Power  (small vessel, L-L collaterals), mLCx 80% (small vessels), rPDA 80% diffuse (small vessel). RRA.   - TTE 6/11/25: EF 47%. Mild LVH. RWMA present. Multiple segmental abnormalities exist. LV thrombus measuring 5 mm x 6 mm x 7 mm at LV apex. Grade I DD. Normal RVSF  - Antianginal: Ranexa 500mg PO BID, Imdur 60mg daily   - DAPT: Aspirin 81 mg QD (DROP ASA after one week on 6/19), Plavix 75 mg QD  - Return for staging of LAD on Monday, July 14th    #LV Thrombus   - Continue Eliquis 5 mg twice daily   - Will need serial echo outpatient in 3 m to reassess LV thrombus     #Heart failure with mildly reduced ejection fraction (HFmrEF, 41-49%).   - GDMT: Toprol 25mg daily, Valsartan 40 mg daily, Farxiga 10mg daily    #DM  - A1c 10.0%   - Endocrine consulted, discharge medications include ________________    #HLD  - Cholesterol 125, Triglycerides 54, HDL 43, LDL 70  - Continue atorvastatin 80 mg daily    Pt is asymptomatic at this time and denies chest pain, SOB, CISSE, palpitations, dizziness, LOC, N/V, diaphoresis, orthopnea/PND, and leg swelling. Pt able to ambulate and void without complication. VSS. Labs and telemetry reviewed. Pt is a candidate for discharge per Dr. Brown.     Pt given appropriate discharge instructions, pt states they have an appropriate amount of their previous home meds unchanged from this visit at home, and any new medications were sent to their pharmacy. Pt instructed to f/u with Dr. Cristian Salmeron for a telehealth appt within 1 week. 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 Salmeron for a second opinion regarding his known severe CAD and chest pain. Admitted to [facility] Cardiac Telemetry for stable angina and known severe CAD with plan for left heart catheterization. Pt underwent LHC (6/11/25) with Rota/CHRISTIN x2 p-dRCA; LM short, pLAD 99%, mLAD  (L-L collaterals), Power  (small vessel, L-L collaterals), mLCx 80% (small vessels), rPDA 80% diffuse (small vessel). RRA. Pt underwent TTE 6/11/25 revealing EF 47%, RWMA present, LV thrombus measuring 5 x 6 x 7 mm at LV apex (on eliquis). Pt is optimized on medical therapy with plan for discharge and close follow up with Dr. Salmeron. Plan to return for staging of LAD on Monday, July 14th.     #CAD  - LHC (6/11/25) with Rota/CHRISTIN x2 p-dRCA; LM short, pLAD 99%, mLAD  (L-L collaterals), Power  (small vessel, L-L collaterals), mLCx 80% (small vessels), rPDA 80% diffuse (small vessel). RRA.   - TTE 6/11/25: EF 47%. Mild LVH. RWMA present. Multiple segmental abnormalities exist. LV thrombus measuring 5 mm x 6 mm x 7 mm at LV apex. Grade I DD. Normal RVSF  - Antianginal: Ranexa 500mg PO BID, Imdur 60mg daily   - DAPT: Aspirin 81 mg QD (DROP ASA after one week on 6/19), Plavix 75 mg QD  - Return for staging of LAD on Monday, July 14th    #LV Thrombus   - Continue Eliquis 5 mg twice daily   - Will need serial echo outpatient in 3 m to reassess LV thrombus     #Heart failure with mildly reduced ejection fraction (HFmrEF, 41-49%).   - GDMT: Toprol 25mg daily, Valsartan 40 mg daily, Farxiga 10mg daily    #DM  - A1c 10.0%   - Endocrine consulted, discharge medications include ________________    #HLD  - Cholesterol 125, Triglycerides 54, HDL 43, LDL 70  - Continue atorvastatin 80 mg daily    Pt is asymptomatic at this time and denies chest pain, SOB, CISSE, palpitations, dizziness, LOC, N/V, diaphoresis, orthopnea/PND, and leg swelling. Pt able to ambulate and void without complication. VSS. Labs and telemetry reviewed. Pt is a candidate for discharge per Dr. Brown.     Pt given appropriate discharge instructions, pt states they have an appropriate amount of their previous home meds unchanged from this visit at home, and any new medications were sent to their pharmacy. Pt instructed to f/u with Dr. Cristian Salmeron for a telehealth appt within 1 week.     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 Salmeron for a second opinion regarding his known severe CAD and chest pain. Admitted to [facility] Cardiac Telemetry for stable angina and known severe CAD with plan for left heart catheterization. Pt underwent LHC (6/11/25) with Rota/CHRISTIN x2 p-dRCA; LM short, pLAD 99%, mLAD  (L-L collaterals), Power  (small vessel, L-L collaterals), mLCx 80% (small vessels), rPDA 80% diffuse (small vessel). RRA. Pt underwent TTE 6/11/25 revealing EF 47%, RWMA present, LV thrombus measuring 5 x 6 x 7 mm at LV apex (on eliquis). Pt is optimized on medical therapy with plan for discharge and close follow up with Dr. Salmeron. Plan to return for staging of LAD on Monday, July 14th.     #CAD  - LHC (6/11/25) with Rota/CHRISTIN x2 p-dRCA; LM short, pLAD 99%, mLAD  (L-L collaterals), Power  (small vessel, L-L collaterals), mLCx 80% (small vessels), rPDA 80% diffuse (small vessel). RRA.   - TTE 6/11/25: EF 47%. Mild LVH. RWMA present. Multiple segmental abnormalities exist. LV thrombus measuring 5 mm x 6 mm x 7 mm at LV apex. Grade I DD. Normal RVSF  - Antianginal: Ranexa 500mg PO BID, Imdur 60mg daily   - DAPT: Aspirin 81 mg QD (DROP ASA after one week on 6/19), Plavix 75 mg QD  - Return for staging of LAD on Monday, July 14th    #LV Thrombus   - Continue Eliquis 5 mg twice daily   - Will need serial echo outpatient in 3 m to reassess LV thrombus     #Heart failure with mildly reduced ejection fraction (HFmrEF, 41-49%).   - GDMT: Toprol 25mg daily, Valsartan 40 mg daily, Farxiga 10mg daily    #DM  - A1c 10.0%   - Endocrine consulted, discharge medications include metformin 1000 mg twice daily, januvia 100 mg daily , lantus 6 u QHS    #HLD  - Cholesterol 125, Triglycerides 54, HDL 43, LDL 70  - Continue atorvastatin 80 mg daily    Pt is asymptomatic at this time and denies chest pain, SOB, CISSE, palpitations, dizziness, LOC, N/V, diaphoresis, orthopnea/PND, and leg swelling. Pt able to ambulate and void without complication. VSS. Labs and telemetry reviewed. Pt is a candidate for discharge per Dr. Brown.     Pt given appropriate discharge instructions, pt states they have an appropriate amount of their previous home meds unchanged from this visit at home, and any new medications were sent to their pharmacy. Pt instructed to f/u with Dr. Cristian Salmeron for telehealth appointment on 06/17 at 12:45 pm.     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 Salmeron for a second opinion regarding his known severe CAD and chest pain. Admitted to [facility] Cardiac Telemetry for stable angina and known severe CAD with plan for left heart catheterization. Pt underwent LHC (6/11/25) with Rota/CHRISTIN x2 p-dRCA; LM short, pLAD 99%, mLAD  (L-L collaterals), Power  (small vessel, L-L collaterals), mLCx 80% (small vessels), rPDA 80% diffuse (small vessel). RRA. Pt underwent TTE 6/11/25 revealing EF 47%, RWMA present, LV thrombus measuring 5 x 6 x 7 mm at LV apex (on eliquis). Pt is optimized on medical therapy with plan for discharge and close follow up with Dr. Salmeron. Plan to return for staging of LAD on Monday, July 14th.     #CAD  - LHC (6/11/25) with Rota/CHRISTIN x2 p-dRCA; LM short, pLAD 99%, mLAD  (L-L collaterals), Power  (small vessel, L-L collaterals), mLCx 80% (small vessels), rPDA 80% diffuse (small vessel). RRA.   - TTE 6/11/25: EF 47%. Mild LVH. RWMA present. Multiple segmental abnormalities exist. LV thrombus measuring 5 mm x 6 mm x 7 mm at LV apex. Grade I DD. Normal RVSF  - Antianginal: Ranexa 500mg PO BID, Imdur 60mg daily   - DAPT: Aspirin 81 mg QD (DROP ASA after one week on 6/19), Plavix 75 mg QD  - Return for staging of LAD on Monday, July 14th    #LV Thrombus   - Continue Eliquis 5 mg twice daily   - Will need serial echo outpatient in 3 m to reassess LV thrombus     #Heart failure with mildly reduced ejection fraction (HFmrEF, 41-49%).   - GDMT: Toprol 25mg daily, Valsartan 40 mg daily, Farxiga 10mg daily    #DM  - A1c 10.0%   - Endocrine consulted, discharge medications include metformin 1000 mg twice daily, januvia 100 mg daily , lantus 6 u QHS    #HLD  - Cholesterol 125, Triglycerides 54, HDL 43, LDL 70  - Continue atorvastatin 80 mg daily    Pt is asymptomatic at this time and denies chest pain, SOB, CISSE, palpitations, dizziness, LOC, N/V, diaphoresis, orthopnea/PND, and leg swelling. Pt able to ambulate and void without complication. VSS. Labs and telemetry reviewed. Pt is a candidate for discharge per Dr. Brown.     Pt given appropriate discharge instructions, pt states they have an appropriate amount of their previous home meds unchanged from this visit at home, and any new medications were sent to their pharmacy. Pt instructed to f/u with Dr. Cristian Salmeron for telehealth appointment on 06/17 at 12:45 pm.     Cardiac Rehab (STEMI/NSTEMI/ACS/Unstable Angina/CHF/Post PCI):            *Education on benefits of Cardiac Rehab provided to patient: No         *Referral and Prescription Given for Cardiac Rehab : No, need staging of LAD         *Pt given list of locations & instructed to contact their insurance company to review list of participating providers    Discharge medications:  o Aspirin 81 mg QD (x 1 week, Discontinue on 6/19)  o Plavix 75 mg QD  o Eliquis 5 mg BID  o Ranolazine 500 mg BID  o Toprol XL 25 mg QD  o Valsartan 40 mg QD  o Gabapentin 600 mg TID  o Atorvastatin 80 mg QD  o Farxiga 10 mg QD  o Pantoprazole 40 mg QD  o Metformin 1000 mg BID  o Januvia 100 mg QD  o Lantus 6 units at bedtime  o Imdur 60 mg QD

## 2025-06-12 NOTE — PROGRESS NOTE ADULT - PROBLEM SELECTOR PLAN 4
- on Lantus 20-22u daily at bedtime + Metformin 1000mg PO BID + farxiga 10 mg PO daily   - a1c 10.0%   - endocrine consulted, appreciate recs    - c/w low insulin sliding scale. Start insulin 4 units premeals   - lantus d/c'd

## 2025-06-12 NOTE — DISCHARGE NOTE NURSING/CASE MANAGEMENT/SOCIAL WORK - NSDCPEFALRISK_GEN_ALL_CORE
For information on Fall & Injury Prevention, visit: [email or web address] OR  [email or web address] OR  https://www.cdc.gov/steadi/patient.html

## 2025-06-12 NOTE — PROGRESS NOTE ADULT - PROBLEM SELECTOR PLAN 2
- on Aspirin 81mg daily and Eliquis 5mg BID   - continue with heparin gtt full AC. Transition to Eliquis 5 mg BID PO once no further planned procedures  - TTE 6/11: EF 47%. Mild LVH. Regional wall motion abnormalities present. Multiple segmental abnormalities exist. Left ventricular thrombus measuring 5 mm x 6 mm x 7 mm visualized at left ventricular apex. Grade I DD. Normal RV size and systolic function

## 2025-06-12 NOTE — DISCHARGE NOTE PROVIDER - NSDCMRMEDTOKEN_GEN_ALL_CORE_FT
aspirin 81 mg oral delayed release tablet: 1 tab(s) orally once a day  Dexcom G7 : Use as directed  Dexcom G7 Sensors: Change every 10 days  Eliquis 5 mg oral tablet: 1 tab(s) orally 2 times a day  Eliquis 5 mg oral tablet: 1 tab(s) orally 2 times a day  Farxiga 10 mg oral tablet: 1 tab(s) orally once a day  Farxiga 10 mg oral tablet: 1 tab(s) orally once a day  gabapentin 600 mg oral tablet: 1 tab(s) orally 3 times a day  Imdur 60 mg oral tablet, extended release: 1 tab(s) orally once a day  Lantus 100 units/mL subcutaneous solution: 20 unit(s) subcutaneous once a day (at bedtime)  Lipitor 80 mg oral tablet: 1 tab(s) orally once a day  metFORMIN 1000 mg oral tablet: 1 tab(s) orally 2 times a day  Ranexa 500 mg oral tablet, extended release: 1 tab(s) orally 2 times a day  Toprol-XL 25 mg oral tablet, extended release: 1 tab(s) orally once a day  valsartan 40 mg oral tablet: 1 tab(s) orally once a day   aspirin 81 mg oral delayed release tablet: 1 tab(s) orally once a day Only take for one week then discontinue on 6/19  clopidogrel 75 mg oral tablet: 1 tab(s) orally once a day  Dexcom G7 : Use as directed  Dexcom G7 Sensors: Change every 10 days  Eliquis 5 mg oral tablet: 1 tab(s) orally 2 times a day  Farxiga 10 mg oral tablet: 1 tab(s) orally once a day  gabapentin 600 mg oral tablet: 1 tab(s) orally 3 times a day  isosorbide mononitrate 60 mg oral tablet, extended release: 1 tab(s) orally once a day  Januvia 100 mg oral tablet: 1 tab(s) orally once a day  Lantus 100 units/mL subcutaneous solution: 4 unit(s) subcutaneous once a day (at bedtime)  Lipitor 80 mg oral tablet: 1 tab(s) orally once a day  metFORMIN 1000 mg oral tablet: 1 tab(s) orally 2 times a day  pantoprazole 40 mg oral delayed release tablet: 1 tab(s) orally once a day (before a meal)  Ranexa 500 mg oral tablet, extended release: 1 tab(s) orally 2 times a day  Toprol-XL 25 mg oral tablet, extended release: 1 tab(s) orally once a day  valsartan 40 mg oral tablet: 1 tab(s) orally once a day   aspirin 81 mg oral delayed release tablet: 1 tab(s) orally once a day Only take for one week then discontinue on 6/19  clopidogrel 75 mg oral tablet: 1 tab(s) orally once a day  Dexcom G7 : Use as directed  Dexcom G7 Sensors: Change every 10 days  Eliquis 5 mg oral tablet: 1 tab(s) orally 2 times a day  Farxiga 10 mg oral tablet: 1 tab(s) orally once a day  gabapentin 600 mg oral tablet: 1 tab(s) orally 3 times a day  isosorbide mononitrate 60 mg oral tablet, extended release: 1 tab(s) orally once a day  Januvia 100 mg oral tablet: 1 tab(s) orally once a day  Lantus 100 units/mL subcutaneous solution: 6 unit(s) subcutaneous once a day (at bedtime)  Lipitor 80 mg oral tablet: 1 tab(s) orally once a day  metFORMIN 1000 mg oral tablet: 1 tab(s) orally 2 times a day  pantoprazole 40 mg oral delayed release tablet: 1 tab(s) orally once a day (before a meal)  Ranexa 500 mg oral tablet, extended release: 1 tab(s) orally 2 times a day  Toprol-XL 25 mg oral tablet, extended release: 1 tab(s) orally once a day  valsartan 40 mg oral tablet: 1 tab(s) orally once a day   aspirin 81 mg oral delayed release tablet: 1 tab(s) orally once a day Only take for one week then discontinue on 6/19  clopidogrel 75 mg oral tablet: 1 tab(s) orally once a day  Dexcom G7 : Use as directed  Dexcom G7 Sensors: Change every 10 days  Eliquis 5 mg oral tablet: 1 tab(s) orally 2 times a day  Farxiga 10 mg oral tablet: 1 tab(s) orally once a day  gabapentin 600 mg oral tablet: 1 tab(s) orally 3 times a day  isosorbide mononitrate 60 mg oral tablet, extended release: 1 tab(s) orally once a day  Januvia 100 mg oral tablet: 1 tab(s) orally once a day  Lantus 100 units/mL subcutaneous solution: 6 unit(s) subcutaneous once a day (at bedtime)  Lantus Solostar Pen 100 units/mL subcutaneous solution: 6 unit(s) subcutaneous once a day (at bedtime)  Lipitor 80 mg oral tablet: 1 tab(s) orally once a day  metFORMIN 1000 mg oral tablet: 1 tab(s) orally 2 times a day  pantoprazole 40 mg oral delayed release tablet: 1 tab(s) orally once a day (before a meal)  Ranexa 500 mg oral tablet, extended release: 1 tab(s) orally 2 times a day  Toprol-XL 25 mg oral tablet, extended release: 1 tab(s) orally once a day  valsartan 40 mg oral tablet: 1 tab(s) orally once a day

## 2025-06-12 NOTE — DISCHARGE NOTE PROVIDER - CARE PROVIDERS DIRECT ADDRESSES
,DirectAddress_Unknown 79 yo male is scheduled of for left total hip replacement on 2/28/2020 with Dr Chairez

## 2025-06-12 NOTE — DISCHARGE NOTE PROVIDER - NSDCHC_MEDRECSTATUS_GEN_ALL_CORE
Admission Reconciliation is Not Complete  Discharge Reconciliation is Not Complete no Admission Reconciliation is Completed  Discharge Reconciliation is Completed

## 2025-06-12 NOTE — DISCHARGE NOTE PROVIDER - NSDCCPCAREPLAN_GEN_ALL_CORE_FT
PRINCIPAL DISCHARGE DIAGNOSIS  Diagnosis: Stable angina  Assessment and Plan of Treatment: You were found to have blockages in the arteries of your heart, also known as Coronary Artery Disease. You underwent a cardiac catheterization on 6/11/25 and received two stents and rotaatherectomy to proximal right coronary artery. You have residual disease in your left anterior descending artery that is blocked that you need to return for staged intervention for LAD on Monday, July 14th.   PLEASE CONTINUE ASPIRIN 81MG DAILY, PLAVIX 75MG DAILY AND ELIQUIS 5 MG TWICE DAILY. YOU WILL ONLY TAKE ASPIRIN FOR ONE WEEK THEN DISCONTINUE ASPIRIN ON 6/19. DO NOT STOP THESE MEDICATIONS FOR ANY REASON AS THEY ARE KEEPING YOUR STENT OPEN AND PREVENTING A HEART ATTACK.   Avoid strenuous activity or heavy lifting anything more than 5lbs for the next five days. Do not take a bath or swim for the next five days; you may shower. For any bleeding or hematoma formation (hardened blood collection under the skin) at the access site of your ____ please hold pressure and go to the emergency room. Please follow up with  ___ in 1-2 weeks. For recurrent chest pain, please call your doctor or go to the emergency room.        SECONDARY DISCHARGE DIAGNOSES  Diagnosis: Uncontrolled type 2 diabetes mellitus with hyperglycemia, with long-term current use of insulin  Assessment and Plan of Treatment:      PRINCIPAL DISCHARGE DIAGNOSIS  Diagnosis: Stable angina  Assessment and Plan of Treatment: You were found to have blockages in the arteries of your heart, also known as Coronary Artery Disease. You underwent a cardiac catheterization on 6/11/25 and received two stents and rotaatherectomy to proximal right coronary artery. You have residual disease in your left anterior descending artery that is blocked that you need to return for staged intervention for LAD on Monday, July 14th.   PLEASE CONTINUE ASPIRIN 81MG DAILY, PLAVIX 75MG DAILY AND ELIQUIS 5 MG TWICE DAILY. YOU WILL ONLY TAKE ASPIRIN FOR ONE WEEK THEN DISCONTINUE ASPIRIN ON 6/19/25. DO NOT STOP THESE MEDICATIONS FOR ANY REASON AS THEY ARE KEEPING YOUR STENT OPEN AND PREVENTING A HEART ATTACK.   PLEASE CONTINUE IMDUR 60 MG DAILY TO HELP CHEST PAIN.   Avoid strenuous activity or heavy lifting anything more than 5lbs for the next five days. Do not take a bath or swim for the next five days; you may shower. For any bleeding or hematoma formation (hardened blood collection under the skin) at the access site of your wrist please hold pressure and go to the emergency room. Please follow up with Dr. Salmeron via telehealth appointment at 12:45 pm on 6/17/25.  For recurrent chest pain, please call your doctor or go to the emergency room.        SECONDARY DISCHARGE DIAGNOSES  Diagnosis: Uncontrolled type 2 diabetes mellitus with hyperglycemia, with long-term current use of insulin  Assessment and Plan of Treatment: Your Hemoglobin A1c is 10% and your goal A1c is less than 7.0%. This number measures your average blood sugar level over the last three months.  Please continue ___ as listed for diabetes. Maintain a low carbohydrate, low sugar diet, exercise, monitor your fingerstick blood sugars regarly and follow up with your Endocrinologist/Primary Care Doctor.  Follow up on discharge with [facility] by calling [phone] to make an appointment.    Diagnosis: Left ventricular thrombus  Assessment and Plan of Treatment: You are on eliquis which is a blood thinning medication for your blood clot that was seen in the bottom chamber of the heart on an ultrasound. You must continue eliquis 5 mg twice daily. There is a one month free trial sent to [facility] pharmacy for you to .    Diagnosis: Heart failure with mildly reduced ejection fraction  Assessment and Plan of Treatment: You have a weak heart, also known as Congestive Heart Failure (CHF). Heart failure is a condition in which the heart does not pump or fill with blood well. As a result, the heart lags behind in its job of moving blood throughout the body. This can lead to symptoms such as swelling, trouble breathing, and feeling tired. Your Ejection Fraction (EF) is 47%, a normal EF is 55-60%.  -Please continue farxiga 10 mg daily, Toprol XL 25 mg daily and Valsartan 40 mg daily  to help strenghten your heart muscle  -Avoid drinking more than 1.5L of fluid daily and maintain a low salt diet (max 2grams daily).  -Please weigh yourself daily, for any significant increases in daily weight of 2lbs/day or 5lbs/week with associated swelling in the legs or abdomen and/or shortness of breath, please call your doctor or go to the emergency room.    Diagnosis: HLD (hyperlipidemia)  Assessment and Plan of Treatment: Please continue atorvastatin 80 mg daily at bedtime to keep your cholesterol low. High cholesterol contributes to heart disease.       PRINCIPAL DISCHARGE DIAGNOSIS  Diagnosis: Stable angina  Assessment and Plan of Treatment: You were found to have blockages in the arteries of your heart, also known as Coronary Artery Disease. You underwent a cardiac catheterization on 6/11/25 and received two stents and rotaatherectomy to proximal right coronary artery. You have residual disease in your left anterior descending artery that is blocked that you need to return for staged intervention for LAD on Monday, July 14th.   PLEASE CONTINUE ASPIRIN 81MG DAILY, PLAVIX 75MG DAILY AND ELIQUIS 5 MG TWICE DAILY. YOU WILL ONLY TAKE ASPIRIN FOR ONE WEEK THEN DISCONTINUE ASPIRIN ON 6/19/25. DO NOT STOP THESE MEDICATIONS FOR ANY REASON AS THEY ARE KEEPING YOUR STENT OPEN AND PREVENTING A HEART ATTACK.   PLEASE CONTINUE IMDUR 60 MG DAILY TO HELP CHEST PAIN.   Avoid strenuous activity or heavy lifting anything more than 5lbs for the next five days. Do not take a bath or swim for the next five days; you may shower. For any bleeding or hematoma formation (hardened blood collection under the skin) at the access site of your wrist please hold pressure and go to the emergency room. Please follow up with Dr. Salmeron via telehealth appointment at 12:45 pm on 6/17/25.  For recurrent chest pain, please call your doctor or go to the emergency room.        SECONDARY DISCHARGE DIAGNOSES  Diagnosis: Uncontrolled type 2 diabetes mellitus with hyperglycemia, with long-term current use of insulin  Assessment and Plan of Treatment: Your Hemoglobin A1c is 10% and your goal A1c is less than 7.0%. This number measures your average blood sugar level over the last three months.  Please continue metformin 1000 mg twice daily, farxiga 10 mg daily. Start januvia 100 mg daily and lantus 6 units at bedtime as listed for diabetes. Maintain a low carbohydrate, low sugar diet, exercise, monitor your fingerstick blood sugars regarly and follow up with your Endocrinologist/Primary Care Doctor.  Follow up on discharge with [facility] by calling [phone] to make an appointment.    Diagnosis: Left ventricular thrombus  Assessment and Plan of Treatment: You are on eliquis which is a blood thinning medication for your blood clot that was seen in the bottom chamber of the heart on an ultrasound. You must continue eliquis 5 mg twice daily. There is a one month free trial sent to [facility] pharmacy for you to .    Diagnosis: Heart failure with mildly reduced ejection fraction  Assessment and Plan of Treatment: You have a weak heart, also known as Congestive Heart Failure (CHF). Heart failure is a condition in which the heart does not pump or fill with blood well. As a result, the heart lags behind in its job of moving blood throughout the body. This can lead to symptoms such as swelling, trouble breathing, and feeling tired. Your Ejection Fraction (EF) is 47%, a normal EF is 55-60%.  -Please continue farxiga 10 mg daily, Toprol XL 25 mg daily and Valsartan 40 mg daily  to help strenghten your heart muscle  -Avoid drinking more than 1.5L of fluid daily and maintain a low salt diet (max 2grams daily).  -Please weigh yourself daily, for any significant increases in daily weight of 2lbs/day or 5lbs/week with associated swelling in the legs or abdomen and/or shortness of breath, please call your doctor or go to the emergency room.    Diagnosis: HLD (hyperlipidemia)  Assessment and Plan of Treatment: Please continue atorvastatin 80 mg daily at bedtime to keep your cholesterol low. High cholesterol contributes to heart disease.

## 2025-06-12 NOTE — DISCHARGE NOTE PROVIDER - PROVIDER TOKENS
FREE:[LAST:[Jaron],FIRST:[Cristian],PHONE:[phone]],FAX:[(   )    -],ADDRESS:[Telehealth appointment *** Provider Will Call   [address] [place] [address]],SCHEDULEDAPPT:[06/17/2025],SCHEDULEDAPPTTIME:[12:45 PM]]

## 2025-06-12 NOTE — PROGRESS NOTE ADULT - ASSESSMENT
69 y/o M, Uzbek/English speaking w/ PMHx HLD, IDDM, severe 2vCAD, HFmrEF (EF 45%), LV apical thrombus (on Eliquis last dose 6/10/25 AM) who presented to outpatient cardiologist Dr. Salmeron for a second opinion regarding his known severe CAD and chest pain. Admitted to [facility] for stable angina and known severe CAD with plan for R/LHC with Dr. Cristian Salmeron.

## 2025-06-12 NOTE — DISCHARGE NOTE PROVIDER - CARE PROVIDER_API CALL
Cristian Salmeron  Telehealth appointment *** Provider Will Call   [address] [facility], [place] [address]  Phone: [phone]  Fax: (   )    -  Scheduled Appointment: 06/17/2025 12:45 PM

## 2025-06-12 NOTE — DISCHARGE NOTE NURSING/CASE MANAGEMENT/SOCIAL WORK - PATIENT PORTAL LINK FT
You can access the FollowMyHealth Patient Portal offered by [facility] by registering at the following website: [email or web address]. By joining [employer]’s FollowMyHealth portal, you will also be able to view your health information using other applications (apps) compatible with our system.

## 2025-06-12 NOTE — PROGRESS NOTE ADULT - PROBLEM SELECTOR PROBLEM 3
Heart failure with mildly reduced ejection fraction (HFmrEF, 41-49%)
Heart failure with mildly reduced ejection fraction (HFmrEF, 41-49%)

## 2025-06-12 NOTE — PROGRESS NOTE ADULT - PROBLEM SELECTOR PLAN 3
- pt is euvolemic   - GDMT: continue home Toprol 25mg daily, Valsartan 40 mg daily, Farxiga 10mg daily  - strict intake and output, daily weight
- pt is euvolemic   - GDMT: continue home Toprol 25mg daily, Valsartan 40 mg daily, Farxiga 10mg daily  - strict intake and output, daily weight

## 2025-06-13 ENCOUNTER — NON-APPOINTMENT (OUTPATIENT)
Age: 70
End: 2025-06-13

## 2025-06-17 ENCOUNTER — APPOINTMENT (OUTPATIENT)
Dept: HEART AND VASCULAR | Facility: CLINIC | Age: 70
End: 2025-06-17
Payer: MEDICARE

## 2025-06-17 PROBLEM — E11.628 TYPE 2 DIABETES MELLITUS WITH OTHER SKIN COMPLICATION: Status: RESOLVED | Noted: 2025-06-11 | Resolved: 2025-06-17

## 2025-06-17 PROCEDURE — 99214 OFFICE O/P EST MOD 30 MIN: CPT | Mod: 93

## 2025-06-17 RX ORDER — INSULIN GLARGINE-YFGN 100 [IU]/ML
12 INJECTION, SOLUTION SUBCUTANEOUS
Refills: 0 | DISCHARGE

## 2025-06-17 NOTE — CHART NOTE - NSCHARTNOTEFT_GEN_A_CORE
Endocrinology following for diabetes management. FS reviewed: He needs v    181 mg/dL (06-10 @ 21:50) 10 lantus   63 mg/dL (06-11 @ 07:44) X juice  95 mg/dL (06-11 @ 08:35)  90 mg/dL (06-11 @ 12:02)  68 mg/dL (06-11 @ 16:02) X 1/2 amp + started on d5 at 77  119 mg/dL (06-11 @ 16:26)  245 mg/dL (06-11 @ 20:23) D5 stopped, he had a tuna sandwich  206 mg/dL (06-11 @ 21:49) X  204 mg/dL (06-12 @ 07:28) lispro 4 + 2, ate breakfast   182:       For Discharge:  - c/w home metformin 1000mg BID  - Decrease home lantus to 6 units at bedtime.   - Start Januvia 100mg qd   - C/w home farxiga 10mg qd   - Dexcom CGM covered, provided to patient. teaching will be done today.   - He plans to follow up with endocrinologist in [place].     Discussed with son at bedside/ patient and Tim Adams and Primary team     Lalita Hernandez  Endocrinology Fellow
Post-Discharge Medication Review: Completed	  	  Patient's preferred pharmacy was updated in OMR: [facility] 	  	  Patient contacted to offer medication counseling post-discharge. Medication reconciliation completed. Per patient, medications include:	  	  1.	aspirin 81 mg oral delayed release tablet 1 tab(s) orally once a day Only take for one week then discontinue on 6/19  2.	clopidogrel 75 mg oral tablet 1 tab(s) orally once a day  3.	Eliquis 5 mg oral tablet 1 tab(s) orally 2 times a day  4.	Farxiga 10 mg oral tablet 1 tab(s) orally once a day  5.	gabapentin 600 mg oral tablet 1 tab(s) orally 3 times a day  6.	isosorbide mononitrate 60 mg oral tablet, extended release 1 tab(s) orally once a day  7.	Januvia 100 mg oral tablet 1 tab(s) orally once a day   	  8.	Lipitor 80 mg oral tablet 1 tab(s) orally once a day  9.	metFORMIN 1000 mg oral tablet 1 tab(s) orally 2 times a day  10.	pantoprazole 40 mg oral delayed release tablet 1 tab(s) orally once a day (before a meal)  11.	Ranexa 500 mg oral tablet, extended release 1 tab(s) orally 2 times a day  12.	Toprol-XL 25 mg oral tablet, extended release 1 tab(s) orally once a day  13.	valsartan 40 mg oral tablet 1 tab(s) orally once a day   	  Medications added to OMR (updated per discussion with patient):	  14.	Lantus 100 units/mL  subcutaneous solution 12 unit(s) subcutaneous once a day (at bedtime)    Medications removed from OMR (updated per discussion with patient):	  1.	Lantus 100 units/mL subcutaneous solution 6 unit(s) subcutaneous once a day (at bedtime)  	  Medication name, indication, administration, side effect, and monitoring reviewed for new medications during post discharge counseling visit with patient. Patient demonstrated understanding. Counseling offered for all medications.	  	  Cheko Dahl, Rosalee	  Clinical Pharmacy Specialist, Pharmacy Telehealth Team	  Can be reached via MS Teams or [phone]

## 2025-06-18 RX ORDER — APIXABAN 5 MG/1
5 TABLET, FILM COATED ORAL
Qty: 60 | Refills: 3 | Status: ACTIVE | COMMUNITY
Start: 2025-06-18 | End: 1900-01-01

## 2025-06-18 RX ORDER — ASPIRIN 81 MG/1
81 TABLET, COATED ORAL
Refills: 0 | Status: ACTIVE | COMMUNITY

## 2025-06-18 RX ORDER — METOPROLOL SUCCINATE 25 MG/1
25 TABLET, EXTENDED RELEASE ORAL DAILY
Qty: 1 | Refills: 3 | Status: ACTIVE | COMMUNITY
Start: 1900-01-01 | End: 1900-01-01

## 2025-06-18 RX ORDER — DAPAGLIFLOZIN 10 MG/1
10 TABLET, FILM COATED ORAL DAILY
Qty: 30 | Refills: 6 | Status: ACTIVE | COMMUNITY
Start: 2025-06-18 | End: 1900-01-01

## 2025-06-18 RX ORDER — RANOLAZINE 500 MG/1
500 TABLET, FILM COATED, EXTENDED RELEASE ORAL
Qty: 60 | Refills: 3 | Status: ACTIVE | COMMUNITY
Start: 2025-06-18 | End: 1900-01-01

## 2025-06-18 RX ORDER — CLOPIDOGREL BISULFATE 75 MG/1
75 TABLET, FILM COATED ORAL DAILY
Qty: 90 | Refills: 3 | Status: ACTIVE | COMMUNITY
Start: 2025-06-18 | End: 1900-01-01

## 2025-06-18 RX ORDER — ATORVASTATIN CALCIUM 80 MG/1
80 TABLET, FILM COATED ORAL DAILY
Qty: 90 | Refills: 3 | Status: ACTIVE | COMMUNITY
Start: 2025-06-18 | End: 1900-01-01

## 2025-06-18 RX ORDER — PANTOPRAZOLE 40 MG/1
40 TABLET, DELAYED RELEASE ORAL DAILY
Qty: 90 | Refills: 3 | Status: ACTIVE | COMMUNITY
Start: 2025-06-18 | End: 1900-01-01

## 2025-06-18 RX ORDER — ISOSORBIDE MONONITRATE 60 MG/1
60 TABLET, EXTENDED RELEASE ORAL DAILY
Qty: 30 | Refills: 6 | Status: ACTIVE | COMMUNITY
Start: 2025-06-18 | End: 1900-01-01

## 2025-06-18 RX ORDER — VALSARTAN 40 MG/1
40 TABLET, COATED ORAL DAILY
Qty: 90 | Refills: 3 | Status: ACTIVE | COMMUNITY
Start: 1900-01-01 | End: 1900-01-01

## 2025-06-20 DIAGNOSIS — I11.0 HYPERTENSIVE HEART DISEASE WITH HEART FAILURE: ICD-10-CM

## 2025-06-20 DIAGNOSIS — E78.5 HYPERLIPIDEMIA, UNSPECIFIED: ICD-10-CM

## 2025-06-20 DIAGNOSIS — I50.22 CHRONIC SYSTOLIC (CONGESTIVE) HEART FAILURE: ICD-10-CM

## 2025-06-20 DIAGNOSIS — R57.0 CARDIOGENIC SHOCK: ICD-10-CM

## 2025-06-20 DIAGNOSIS — Z79.899 OTHER LONG TERM (CURRENT) DRUG THERAPY: ICD-10-CM

## 2025-06-20 DIAGNOSIS — Z79.82 LONG TERM (CURRENT) USE OF ASPIRIN: ICD-10-CM

## 2025-06-20 DIAGNOSIS — I25.84 CORONARY ATHEROSCLEROSIS DUE TO CALCIFIED CORONARY LESION: ICD-10-CM

## 2025-06-20 DIAGNOSIS — E11.65 TYPE 2 DIABETES MELLITUS WITH HYPERGLYCEMIA: ICD-10-CM

## 2025-06-20 DIAGNOSIS — Z79.4 LONG TERM (CURRENT) USE OF INSULIN: ICD-10-CM

## 2025-06-20 DIAGNOSIS — I25.5 ISCHEMIC CARDIOMYOPATHY: ICD-10-CM

## 2025-06-20 DIAGNOSIS — I25.110 ATHEROSCLEROTIC HEART DISEASE OF NATIVE CORONARY ARTERY WITH UNSTABLE ANGINA PECTORIS: ICD-10-CM

## 2025-06-20 DIAGNOSIS — I51.3 INTRACARDIAC THROMBOSIS, NOT ELSEWHERE CLASSIFIED: ICD-10-CM

## 2025-06-20 DIAGNOSIS — Z79.84 LONG TERM (CURRENT) USE OF ORAL HYPOGLYCEMIC DRUGS: ICD-10-CM

## 2025-06-20 DIAGNOSIS — E11.40 TYPE 2 DIABETES MELLITUS WITH DIABETIC NEUROPATHY, UNSPECIFIED: ICD-10-CM

## 2025-06-20 DIAGNOSIS — Z79.01 LONG TERM (CURRENT) USE OF ANTICOAGULANTS: ICD-10-CM

## 2025-06-20 DIAGNOSIS — Z87.891 PERSONAL HISTORY OF NICOTINE DEPENDENCE: ICD-10-CM

## 2025-07-03 RX ORDER — ISOSORBIDE MONONITRATE 60 MG/1
1 TABLET, EXTENDED RELEASE ORAL
Qty: 30 | Refills: 2
Start: 2025-07-03 | End: 2025-09-30

## 2025-07-03 RX ORDER — ATORVASTATIN CALCIUM 80 MG/1
1 TABLET, FILM COATED ORAL
Qty: 30 | Refills: 3
Start: 2025-07-03 | End: 2025-10-30

## 2025-07-03 RX ORDER — RANOLAZINE 1000 MG/1
1 TABLET, FILM COATED, EXTENDED RELEASE ORAL
Qty: 60 | Refills: 2
Start: 2025-07-03 | End: 2025-09-30

## 2025-07-03 RX ORDER — INSULIN GLARGINE-YFGN 100 [IU]/ML
6 INJECTION, SOLUTION SUBCUTANEOUS
Qty: 1 | Refills: 2
Start: 2025-07-03 | End: 2025-09-30

## 2025-07-03 RX ORDER — GABAPENTIN 400 MG/1
1 CAPSULE ORAL
Qty: 90 | Refills: 2
Start: 2025-07-03 | End: 2025-09-30

## 2025-07-03 RX ORDER — SITAGLIPTIN 100 MG/1
1 TABLET, FILM COATED ORAL
Qty: 30 | Refills: 2
Start: 2025-07-03 | End: 2025-09-30

## 2025-07-03 RX ORDER — METOPROLOL SUCCINATE 50 MG/1
1 TABLET, EXTENDED RELEASE ORAL
Qty: 30 | Refills: 2
Start: 2025-07-03 | End: 2025-09-30

## 2025-07-03 RX ORDER — APIXABAN 2.5 MG/1
1 TABLET, FILM COATED ORAL
Qty: 60 | Refills: 10
Start: 2025-07-03 | End: 2026-05-28

## 2025-07-03 RX ORDER — CLOPIDOGREL BISULFATE 75 MG/1
1 TABLET, FILM COATED ORAL
Qty: 30 | Refills: 10
Start: 2025-07-03 | End: 2026-05-28

## 2025-07-03 RX ORDER — METFORMIN HYDROCHLORIDE 850 MG/1
1 TABLET ORAL
Qty: 60 | Refills: 2
Start: 2025-07-03 | End: 2025-09-30

## 2025-07-03 RX ORDER — DAPAGLIFLOZIN 5 MG/1
1 TABLET, FILM COATED ORAL
Qty: 30 | Refills: 2
Start: 2025-07-03 | End: 2025-09-30

## 2025-07-09 VITALS
HEIGHT: 65 IN | OXYGEN SATURATION: 97 % | HEART RATE: 69 BPM | TEMPERATURE: 97 F | SYSTOLIC BLOOD PRESSURE: 118 MMHG | DIASTOLIC BLOOD PRESSURE: 57 MMHG | WEIGHT: 130.07 LBS | RESPIRATION RATE: 16 BRPM

## 2025-07-09 NOTE — H&P ADULT - CARDIOVASCULAR
normal/regular rate and rhythm/no rub/no murmur/no pedal edema/vascular no rub/no murmur/no pedal edema/bradycardia/vascular

## 2025-07-09 NOTE — H&P ADULT - RESPIRATORY
normal/clear to auscultation bilaterally/no wheezes/no rales/no rhonchi Diminished BS throughout B/L/no wheezes/no rales/no rhonchi

## 2025-07-09 NOTE — H&P ADULT - NS ATTEND AMEND GEN_ALL_CORE FT
This is a 69 y/o M, well known to me, with a hx of complex multivessel CAD (surgical turndown from multiple centers and turned down from complex PCI centers), who now presents for staged complex LAD  PCI. Patient also has a hx of HTN, HLD, DM2 (improved control), and LV thrombus which will be reassessed today on limited TTE. Has been on 4 months of anticoagulation w/ Eliquis, and GDMT for his ischemic cardiomyopathy/HFrEF (originally 30-35%, now improved to 45%).     [facility] 6/11/25 - with Rota/CHRISTIN x2 p-dRCA; LM short, pLAD 99% subtotally occluded, mLAD  (L-L collaterals), Power  (small vessel, L-L collaterals), mLCx 80% (small vessels), RCA 95% proximal nodularity with diffuse 80-90% disease, rPDA 80% diffuse (small vessel).    After his initial intervention, his life-limiting angina and NYHA 3 HF symptoms resolved, and today he presents for completion of his revascularization.    Limited TTE - LVEF improved to 50%, apical/anterior LV hypokinesis, LV thrombus RESOLVED, but apical LV swirling/slow-flow present. (Confirmed by echo attending Dr. Cain).    Underwent complex PCI - impella supported - LAD  crossed successfully with AWE, followed by rota/IVL and 3DES with excellent result. Impella removed without issue.    Patient will continue to follow up with me post-discharge. Please see discharge for final med rec.

## 2025-07-09 NOTE — H&P ADULT - NSICDXPASTMEDICALHX_GEN_ALL_CORE_FT
PAST MEDICAL HISTORY:  CAD (coronary artery disease)     Hyperlipidemia     Hypertension     Ischemic cardiomyopathy     LV (left ventricular) mural thrombus

## 2025-07-09 NOTE — H&P ADULT - NSHPLABSRESULTS_GEN_ALL_CORE
12.2   7.94  )-----------( 199      ( 14 Jul 2025 10:55 )             36.8       07-14    138  |  103  |  32[H]  ----------------------------<  126[H]  5.4[H]   |  25  |  1.06    Ca    9.3      14 Jul 2025 11:29  Mg     1.9     07-14    TPro  6.9  /  Alb  3.9  /  TBili  0.4  /  DBili  x   /  AST  24  /  ALT  31  /  AlkPhos  48  07-14      PT/INR - ( 14 Jul 2025 10:55 )   PT: 11.6 sec;   INR: 0.99          PTT - ( 14 Jul 2025 10:55 )  PTT:30.9 sec    CARDIAC MARKERS ( 14 Jul 2025 11:29 )  x     / x     / x     / x     / 3.2 ng/mL        Urinalysis Basic - ( 14 Jul 2025 11:29 )    Color: x / Appearance: x / SG: x / pH: x  Gluc: 126 mg/dL / Ketone: x  / Bili: x / Urobili: x   Blood: x / Protein: x / Nitrite: x   Leuk Esterase: x / RBC: x / WBC x   Sq Epi: x / Non Sq Epi: x / Bacteria: x        EKG: SB @ 55 BPM w/ LAFB, TWI I/AVL, flattened T waves in septal leads, APC noted.

## 2025-07-09 NOTE — H&P ADULT - HISTORY OF PRESENT ILLNESS
Cardiologist: Dr. Salmeron  Pharmacy: [facility]   Escort:     69 y/o M, Arabic/English speaking w/ PMHx HLD, IDDM, severe 2vCAD (deemed not a good surgical candidate given uncontrolled DM, s/p CHRISTIN x 2 p/dRCA 6/11/25 @ [facility]), HFmrEF (EF 45%), LV apical thrombus (on Eliquis, last dose ____), recent admission to [facility] 6/10-6/12/25 for chest pain evaluation. Pt underwent cardiac cath with PCI to RCA and residual LAD and LCx disease (report as below). Since discharge from the hospital pt endorses _____. Pt reports compliance with DAPT? Pt denies ____??fever, chills, cough, CP, palpitations, SOB, PND/orthopnea, E edema, abdominal pain, N/V/D, dizziness, syncope. TTE 6/11/25 revealing EF 47%, RWMA present, LV thrombus measuring 5 x 6 x 7 mm at LV apex. In light of pts risk factors, CCS class III anginal symptoms and known residual disease, pt now presents to [facility] for staged PCI of the LAD.     Cath history:   [facility] 6/11/25 with Rota/CHRISTIN x2 p-dRCA; LM short, pLAD 99%, mLAD  (L-L collaterals), Power  (small vessel, L-L collaterals), mLCx 80% (small vessels), rPDA 80% diffuse (small vessel). RRA.    Cardiologist: Dr. Salmeron  Pharmacy: [facility]   Escort:     69 y/o M, English/English speaking w/ PMHx HLD, IDDM, severe 2vCAD (deemed not a good surgical candidate given uncontrolled DM, s/p CHRISTIN x 2 p/dRCA 6/11/25 @ [facility]), HFmrEF (EF 45%), LV apical thrombus (on Eliquis, last dose ____), recent admission to [facility] 6/10-6/12/25 for chest pain evaluation. Pt underwent cardiac cath with PCI to RCA and residual LAD and LCx disease (report as below). Since discharge from the hospital pt endorses _____. Pt was d/c on Eliquis/Aspirin/Plavix x 1 week then instructed to stop Aspirin. Pt reports compliance with Plavix/Eliquis???  Pt denies ____??fever, chills, cough, CP, palpitations, SOB, PND/orthopnea, E edema, abdominal pain, N/V/D, dizziness, syncope. TTE 6/11/25 revealing EF 47%, RWMA present, LV thrombus measuring 5 x 6 x 7 mm at LV apex. In light of pts risk factors, CCS class III anginal symptoms and known residual disease, pt now presents to [facility] for staged PCI of the LAD.     Cath history:   [facility] 6/11/25 with Rota/CHRISTIN x2 p-dRCA; LM short, pLAD 99%, mLAD  (L-L collaterals), Power  (small vessel, L-L collaterals), mLCx 80% (small vessels), rPDA 80% diffuse (small vessel). RRA.    Cardiologist: Dr. Salmeron  Pharmacy: [facility]   Escort:     Limited TTE with definity prior to cath to evaluate LV thrombus per Dr. Salmeron.   Last dose of Eliquis 24 hrs prior to cath per Dr. Salmeron.     71 y/o M, Korean/English speaking w/ PMHx HLD, IDDM, severe 2vCAD (deemed not a good surgical candidate given uncontrolled DM, s/p CHRISTIN x 2 p/dRCA 6/11/25 @ [facility]), HFmrEF (EF 45%), LV apical thrombus (on Eliquis, last dose ____), recent admission to [facility] 6/10-6/12/25 for chest pain evaluation. Pt underwent cardiac cath with PCI to RCA and residual LAD and LCx disease (report as below). Since discharge from the hospital pt endorses _____. Pt was d/c on Eliquis/Aspirin/Plavix x 1 week then instructed to stop Aspirin. Pt reports compliance with Plavix/Eliquis???  Pt denies ____??fever, chills, cough, CP, palpitations, SOB, PND/orthopnea, E edema, abdominal pain, N/V/D, dizziness, syncope. TTE 6/11/25 revealing EF 47%, RWMA present, LV thrombus measuring 5 x 6 x 7 mm at LV apex. In light of pts risk factors, CCS class III anginal symptoms and known residual disease, pt now presents to [facility] for staged PCI of the LAD.     Cath history:   [facility] 6/11/25 with Rota/CHRISTIN x2 p-dRCA; LM short, pLAD 99%, mLAD  (L-L collaterals), Power  (small vessel, L-L collaterals), mLCx 80% (small vessels), rPDA 80% diffuse (small vessel). RRA.    Cardiologist: Dr. Salmeron  Pharmacy: [facility]   Escort:     Limited TTE with definity prior to cath to evaluate LV thrombus and last dose of Eliquis 24 hrs prior to cath per Dr. Salmeron.     69 y/o M, Persian/English speaking w/ PMHx HLD, IDDM, severe 2vCAD (deemed not a good surgical candidate given uncontrolled DM, s/p CHRISTIN x 2 p/dRCA 6/11/25 @ [facility]), HFmrEF (EF 45%), LV apical thrombus (on Eliquis, last dose 7/12/25 PM), recent admission to [facility] 6/10-6/12/25 for chest pain evaluation. Pt underwent cardiac cath with PCI to RCA and residual LAD and LCx disease (report as below). Since discharge from the hospital pt endorses _____. Pt was d/c on Eliquis/Aspirin/Plavix x 1 week then instructed to stop Aspirin. Pt reports compliance with Plavix/Eliquis???  Pt denies ____??fever, chills, cough, CP, palpitations, SOB, PND/orthopnea, E edema, abdominal pain, N/V/D, dizziness, syncope. TTE 6/11/25 revealing EF 47%, RWMA present, LV thrombus measuring 5 x 6 x 7 mm at LV apex. In light of pts risk factors, CCS class III anginal symptoms and known residual disease, pt now presents to [facility] for staged PCI of the LAD.     Cath history:   [facility] 6/11/25 with Rota/CHRISTIN x2 p-dRCA; LM short, pLAD 99%, mLAD  (L-L collaterals), Power  (small vessel, L-L collaterals), mLCx 80% (small vessels), rPDA 80% diffuse (small vessel). RRA.    Cardiologist: Dr. Salmeron  Pharmacy: [facility]   Escort:     Limited TTE with definity prior to cath to evaluate LV thrombus and last dose of Eliquis 24 hrs prior to cath per Dr. Salmeron.     71 y/o M, Occitan/English speaking w/ PMHx HLD, IDDM, severe 2vCAD (cardiac cath @ [facility] 6/11/25 revealed p-dRCA s/p rota/CHRISTIN x2. pLAD 99%, mLAD  w/ L-L collarerals, Power  small vessel w/ L-L collaterals, mLCx 80% small vessel, RPDA 80% diffuse small vessel, deemed not a good surgical candidate 2/2 uncontrolled DM), HFmrEF (EF 45%), LV apical thrombus (on Eliquis, last dose 7/12/25 PM), who initially presented with right-sided chest pain on exertion which was relieved with rest. Since last PCI, patient endorses ______.        Pt was d/c on Eliquis/Aspirin/Plavix x 1 week then instructed to stop Aspirin. Pt reports compliance with Plavix/Eliquis???   TTE 6/11/25 revealing EF 47%, RWMA present, LV thrombus measuring 5 x 6 x 7 mm at LV apex. In light of pt's risk factors, initial CCS class III anginal symptoms, known physiologically significant disease, pt now presents to [facility] for staged PCI of the LAD.     Cath history:   [facility] 6/11/25 with Rota/CHRISTIN x2 p-dRCA; LM short, pLAD 99%, mLAD  (L-L collaterals), Power  (small vessel, L-L collaterals), mLCx 80% (small vessels), rPDA 80% diffuse (small vessel). RRA.    Cardiologist: Dr. Salmeron  Pharmacy: [facility]  - meds confirmed from d/c summary and cross-referenced by pt's report.   Escort:  Son    Limited TTE with definity prior to cath to evaluate LV thrombus and last dose of Eliquis 24 hrs prior to cath per Dr. Salmeron.  --> Performed.     69 y/o M, Syriac/English speaking w/ PMHx HLD, IDDM, severe 2vCAD (cardiac cath @ [facility] 6/11/25 revealed p-dRCA s/p rota/HCRISTIN x2. pLAD 99%, mLAD  w/ L-L collarerals, Power  small vessel w/ L-L collaterals, mLCx 80% small vessel, RPDA 80% diffuse small vessel, deemed not a good surgical candidate 2/2 uncontrolled DM), HFmrEF (EF 45%), LV apical thrombus (on Eliquis, last dose 7/12/25 PM), who initially presented with right-sided chest pain on exertion which was relieved with rest. Since last PCI, patient states his CP resolved and was feeling much better;  however, states he has been profoundly fatigued over past 3 days since stopping his Eliquis. Denied rhinorrhea, f/c, cough, SOB. Endorses strict compiance with Plavix/Eliquis. TTE 6/11/25 LVEF 47%, RWMA present, LV thrombus measuring 5 x 6 x 7 mm at LV apex. In light of pt's risk factors, initial CCS class III anginal symptoms, known physiologically significant disease, pt now presents to [facility] for staged PCI of the pLAD 99%.      Cath history:   [facility] 6/11/25 with Rota/CHRISTIN x2 p-dRCA; LM short, pLAD 99%, mLAD  (L-L collaterals), Power  (small vessel, L-L collaterals), mLCx 80% (small vessels), rPDA 80% diffuse (small vessel). RRA.

## 2025-07-09 NOTE — H&P ADULT - ASSESSMENT
69 y/o M, Kyrgyz/English speaking w/ PMHx HLD, IDDM, severe 2vCAD (cardiac cath @ [facility] 6/11/25 revealed p-dRCA s/p rota/CHRISTIN x2. pLAD 99%, mLAD  w/ L-L collarerals, Power  small vessel w/ L-L collaterals, mLCx 80% small vessel, RPDA 80% diffuse small vessel, deemed not a good surgical candidate 2/2 uncontrolled DM), HFmrEF (EF 45%), LV apical thrombus (on Eliquis, last dose 7/12/25 PM), who initially presented with right-sided chest pain on exertion which was relieved with rest. Since last PCI, patient states his CP resolved and was feeling much better;  however, states he has been profoundly fatigued over past 3 days since stopping his Eliquis. Denied rhinorrhea, f/c, cough, SOB. Endorses strict compiance with Plavix/Eliquis. TTE 6/11/25 LVEF 47%, RWMA present, LV thrombus measuring 5 x 6 x 7 mm at LV apex. In light of pt's risk factors, initial CCS class III anginal symptoms, known physiologically significant disease, pt now presents to [facility] for staged PCI of the pLAD 99%.        ASA Class III    Mallampati Class III    Takes Plavix at home. Last dose 7/14/25 AM. Endorses strict compliance. Last dose Eliquis 7/11/25. Will provide Ecotrin 325 mg PO x1 pre-cath.     Does not appear overloaded on exam. LVEF 47%. IV   cc bolus followed by IV NS @ 75 cc/hr x 2 hours per hydration protocol.     K+ 5.4. No peaked T waves on EKG. BMP to be resent in the procedure room.     Risks & benefits of procedure and alternative therapy have been explained to the patient including but not limited to: allergic reaction, bleeding with possible need for blood transfusion, infection, renal and vascular compromise, limb damage, arrhythmia, stroke, vessel dissection/perforation, Myocardial infarction, emergent CABG. Informed consent obtained and in chart.       Patient a candidate for sedation: Yes    Sedation Type: Moderate     69 y/o M, French/English speaking w/ PMHx HLD, IDDM, severe 2vCAD (cardiac cath @ [facility] 6/11/25 revealed p-dRCA s/p rota/CHRISTIN x2. 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 Since last PCI, patient states his CP resolved and was feeling much better;  however, states he has been profoundly fatigued over past 3 days since stopping his Eliquis. Denied rhinorrhea, f/c, cough, SOB. Endorses strict compiance with Plavix/Eliquis. TTE 6/11/25 LVEF 47%, RWMA present, LV thrombus measuring 5 x 6 x 7 mm at LV apex. In light of pt's risk factors, initial CCS class III anginal symptoms, known physiologically significant disease, pt now presents to [facility] for staged PCI of the pLAD 99%.        ASA Class III    Mallampati Class III    Takes Plavix at home. Last dose 7/14/25 AM. Endorses strict compliance. Last dose Eliquis 7/11/25. Will provide Ecotrin 325 mg PO x1 pre-cath.     Does not appear overloaded on exam. LVEF 47%. IV   cc bolus followed by IV NS @ 75 cc/hr x 2 hours per hydration protocol.     K+ 5.4. No peaked T waves on EKG. BMP to be resent in the procedure room.     TTE with Definity to be done pre-cath to assess for resolution of LV apical thrombus seen last admit .Patient has been on Eliquis, last dose 7/11/25.     Offered French  however, patient states English was preferred.     Risks & benefits of procedure and alternative therapy have been explained to the patient including but not limited to: allergic reaction, bleeding with possible need for blood transfusion, infection, renal and vascular compromise, limb damage, arrhythmia, stroke, vessel dissection/perforation, Myocardial infarction, emergent CABG. Informed consent obtained and in chart.       Patient a candidate for sedation: Yes    Sedation Type: Moderate

## 2025-07-14 ENCOUNTER — RESULT REVIEW (OUTPATIENT)
Age: 70
End: 2025-07-14

## 2025-07-14 ENCOUNTER — INPATIENT (INPATIENT)
Facility: HOSPITAL | Age: 70
LOS: 0 days | Discharge: HOME | DRG: 217 | End: 2025-07-15
Attending: INTERNAL MEDICINE | Admitting: INTERNAL MEDICINE
Payer: MEDICARE

## 2025-07-14 LAB
A1C WITH ESTIMATED AVERAGE GLUCOSE RESULT: 8.9 % — HIGH (ref 4–5.6)
ALBUMIN SERPL ELPH-MCNC: 3.9 G/DL — SIGNIFICANT CHANGE UP (ref 3.3–5)
ALP SERPL-CCNC: 48 U/L — SIGNIFICANT CHANGE UP (ref 40–120)
ALT FLD-CCNC: 31 U/L — SIGNIFICANT CHANGE UP (ref 10–45)
ANION GAP SERPL CALC-SCNC: 10 MMOL/L — SIGNIFICANT CHANGE UP (ref 5–17)
ANION GAP SERPL CALC-SCNC: 10 MMOL/L — SIGNIFICANT CHANGE UP (ref 5–17)
APTT BLD: 30.9 SEC — SIGNIFICANT CHANGE UP (ref 26.1–36.8)
AST SERPL-CCNC: 24 U/L — SIGNIFICANT CHANGE UP (ref 10–40)
BILIRUB SERPL-MCNC: 0.4 MG/DL — SIGNIFICANT CHANGE UP (ref 0.2–1.2)
BUN SERPL-MCNC: 32 MG/DL — HIGH (ref 7–23)
BUN SERPL-MCNC: 33 MG/DL — HIGH (ref 7–23)
CALCIUM SERPL-MCNC: 8.5 MG/DL — SIGNIFICANT CHANGE UP (ref 8.4–10.5)
CALCIUM SERPL-MCNC: 9.3 MG/DL — SIGNIFICANT CHANGE UP (ref 8.4–10.5)
CHLORIDE SERPL-SCNC: 103 MMOL/L — SIGNIFICANT CHANGE UP (ref 96–108)
CHLORIDE SERPL-SCNC: 104 MMOL/L — SIGNIFICANT CHANGE UP (ref 96–108)
CHOLEST SERPL-MCNC: 110 MG/DL — SIGNIFICANT CHANGE UP
CK MB CFR SERPL CALC: 3.2 NG/ML — SIGNIFICANT CHANGE UP (ref 0–6.7)
CK SERPL-CCNC: 104 U/L — SIGNIFICANT CHANGE UP (ref 30–200)
CO2 SERPL-SCNC: 23 MMOL/L — SIGNIFICANT CHANGE UP (ref 22–31)
CO2 SERPL-SCNC: 25 MMOL/L — SIGNIFICANT CHANGE UP (ref 22–31)
CREAT SERPL-MCNC: 0.98 MG/DL — SIGNIFICANT CHANGE UP (ref 0.5–1.3)
CREAT SERPL-MCNC: 1.06 MG/DL — SIGNIFICANT CHANGE UP (ref 0.5–1.3)
EGFR: 76 ML/MIN/1.73M2 — SIGNIFICANT CHANGE UP
EGFR: 76 ML/MIN/1.73M2 — SIGNIFICANT CHANGE UP
EGFR: 84 ML/MIN/1.73M2 — SIGNIFICANT CHANGE UP
EGFR: 84 ML/MIN/1.73M2 — SIGNIFICANT CHANGE UP
ESTIMATED AVERAGE GLUCOSE: 209 MG/DL — HIGH (ref 68–114)
GLUCOSE SERPL-MCNC: 109 MG/DL — HIGH (ref 70–99)
GLUCOSE SERPL-MCNC: 126 MG/DL — HIGH (ref 70–99)
HCT VFR BLD CALC: 36.8 % — LOW (ref 39–50)
HDLC SERPL-MCNC: 40 MG/DL — LOW
HGB BLD-MCNC: 12.2 G/DL — LOW (ref 13–17)
INR BLD: 0.99 — SIGNIFICANT CHANGE UP (ref 0.85–1.16)
LDLC SERPL-MCNC: 53 MG/DL — SIGNIFICANT CHANGE UP
LIPID PNL WITH DIRECT LDL SERPL: 53 MG/DL — SIGNIFICANT CHANGE UP
MAGNESIUM SERPL-MCNC: 1.9 MG/DL — SIGNIFICANT CHANGE UP (ref 1.6–2.6)
MCHC RBC-ENTMCNC: 30 PG — SIGNIFICANT CHANGE UP (ref 27–34)
MCHC RBC-ENTMCNC: 33.2 G/DL — SIGNIFICANT CHANGE UP (ref 32–36)
MCV RBC AUTO: 90.4 FL — SIGNIFICANT CHANGE UP (ref 80–100)
NONHDLC SERPL-MCNC: 70 MG/DL — SIGNIFICANT CHANGE UP
NRBC # BLD AUTO: 0 K/UL — SIGNIFICANT CHANGE UP (ref 0–0)
NRBC # FLD: 0 K/UL — SIGNIFICANT CHANGE UP (ref 0–0)
NRBC BLD AUTO-RTO: 0 /100 WBCS — SIGNIFICANT CHANGE UP (ref 0–0)
PLATELET # BLD AUTO: 199 K/UL — SIGNIFICANT CHANGE UP (ref 150–400)
PMV BLD: 11.6 FL — SIGNIFICANT CHANGE UP (ref 7–13)
POTASSIUM SERPL-MCNC: 4.8 MMOL/L — SIGNIFICANT CHANGE UP (ref 3.5–5.3)
POTASSIUM SERPL-MCNC: 5.4 MMOL/L — HIGH (ref 3.5–5.3)
POTASSIUM SERPL-SCNC: 4.8 MMOL/L — SIGNIFICANT CHANGE UP (ref 3.5–5.3)
POTASSIUM SERPL-SCNC: 5.4 MMOL/L — HIGH (ref 3.5–5.3)
PROT SERPL-MCNC: 6.9 G/DL — SIGNIFICANT CHANGE UP (ref 6–8.3)
PROTHROM AB SERPL-ACNC: 11.6 SEC — SIGNIFICANT CHANGE UP (ref 9.9–13.4)
RBC # BLD: 4.07 M/UL — LOW (ref 4.2–5.8)
RBC # FLD: 17.1 % — HIGH (ref 10.3–14.5)
SODIUM SERPL-SCNC: 137 MMOL/L — SIGNIFICANT CHANGE UP (ref 135–145)
SODIUM SERPL-SCNC: 138 MMOL/L — SIGNIFICANT CHANGE UP (ref 135–145)
TRIGL SERPL-MCNC: 84 MG/DL — SIGNIFICANT CHANGE UP
WBC # BLD: 7.94 K/UL — SIGNIFICANT CHANGE UP (ref 3.8–10.5)
WBC # FLD AUTO: 7.94 K/UL — SIGNIFICANT CHANGE UP (ref 3.8–10.5)

## 2025-07-14 PROCEDURE — 80053 COMPREHEN METABOLIC PANEL: CPT

## 2025-07-14 PROCEDURE — 82553 CREATINE MB FRACTION: CPT

## 2025-07-14 PROCEDURE — 92972 PERQ TRLUML CORONRY LITHOTRP: CPT

## 2025-07-14 PROCEDURE — 93308 TTE F-UP OR LMTD: CPT | Mod: 26

## 2025-07-14 PROCEDURE — 83735 ASSAY OF MAGNESIUM: CPT

## 2025-07-14 PROCEDURE — 80048 BASIC METABOLIC PNL TOTAL CA: CPT

## 2025-07-14 PROCEDURE — 36415 COLL VENOUS BLD VENIPUNCTURE: CPT

## 2025-07-14 PROCEDURE — 92979 ENDOLUMINL IVUS OCT C EA: CPT | Mod: 26,RC

## 2025-07-14 PROCEDURE — 85027 COMPLETE CBC AUTOMATED: CPT

## 2025-07-14 PROCEDURE — 85610 PROTHROMBIN TIME: CPT

## 2025-07-14 PROCEDURE — 92920 PRQ TRLUML C ANGIOP 1ART&/BR: CPT | Mod: RC

## 2025-07-14 PROCEDURE — 82550 ASSAY OF CK (CPK): CPT

## 2025-07-14 PROCEDURE — 80061 LIPID PANEL: CPT

## 2025-07-14 PROCEDURE — 83036 HEMOGLOBIN GLYCOSYLATED A1C: CPT

## 2025-07-14 PROCEDURE — 82962 GLUCOSE BLOOD TEST: CPT

## 2025-07-14 PROCEDURE — 99152 MOD SED SAME PHYS/QHP 5/>YRS: CPT

## 2025-07-14 PROCEDURE — 85730 THROMBOPLASTIN TIME PARTIAL: CPT

## 2025-07-14 PROCEDURE — 92978 ENDOLUMINL IVUS OCT C 1ST: CPT | Mod: 26,LD

## 2025-07-14 PROCEDURE — 92943 PRQ TRLUML REVSC CH OCC ANT: CPT | Mod: 26,LD

## 2025-07-14 RX ORDER — ATORVASTATIN CALCIUM 80 MG/1
80 TABLET, FILM COATED ORAL AT BEDTIME
Refills: 0 | Status: DISCONTINUED | OUTPATIENT
Start: 2025-07-14 | End: 2025-07-15

## 2025-07-14 RX ORDER — DEXTROSE 50 % IN WATER 50 %
12.5 SYRINGE (ML) INTRAVENOUS ONCE
Refills: 0 | Status: DISCONTINUED | OUTPATIENT
Start: 2025-07-14 | End: 2025-07-15

## 2025-07-14 RX ORDER — CLOPIDOGREL BISULFATE 75 MG/1
75 TABLET, FILM COATED ORAL DAILY
Refills: 0 | Status: DISCONTINUED | OUTPATIENT
Start: 2025-07-15 | End: 2025-07-15

## 2025-07-14 RX ORDER — DEXTROSE 50 % IN WATER 50 %
25 SYRINGE (ML) INTRAVENOUS ONCE
Refills: 0 | Status: DISCONTINUED | OUTPATIENT
Start: 2025-07-14 | End: 2025-07-15

## 2025-07-14 RX ORDER — RANOLAZINE 1000 MG/1
500 TABLET, FILM COATED, EXTENDED RELEASE ORAL
Refills: 0 | Status: DISCONTINUED | OUTPATIENT
Start: 2025-07-15 | End: 2025-07-15

## 2025-07-14 RX ORDER — GLUCAGON 3 MG/1
1 POWDER NASAL ONCE
Refills: 0 | Status: DISCONTINUED | OUTPATIENT
Start: 2025-07-14 | End: 2025-07-15

## 2025-07-14 RX ORDER — MAGNESIUM OXIDE 400 MG
400 TABLET ORAL ONCE
Refills: 0 | Status: COMPLETED | OUTPATIENT
Start: 2025-07-14 | End: 2025-07-14

## 2025-07-14 RX ORDER — ASPIRIN 325 MG
81 TABLET ORAL DAILY
Refills: 0 | Status: DISCONTINUED | OUTPATIENT
Start: 2025-07-15 | End: 2025-07-15

## 2025-07-14 RX ORDER — DAPAGLIFLOZIN 5 MG/1
10 TABLET, FILM COATED ORAL DAILY
Refills: 0 | Status: DISCONTINUED | OUTPATIENT
Start: 2025-07-15 | End: 2025-07-15

## 2025-07-14 RX ORDER — SODIUM CHLORIDE 9 G/1000ML
1000 INJECTION, SOLUTION INTRAVENOUS
Refills: 0 | Status: DISCONTINUED | OUTPATIENT
Start: 2025-07-14 | End: 2025-07-15

## 2025-07-14 RX ORDER — CLOPIDOGREL BISULFATE 75 MG/1
75 TABLET, FILM COATED ORAL DAILY
Refills: 0 | Status: DISCONTINUED | OUTPATIENT
Start: 2025-07-14 | End: 2025-07-14

## 2025-07-14 RX ORDER — ISOSORBIDE MONONITRATE 60 MG/1
60 TABLET, EXTENDED RELEASE ORAL DAILY
Refills: 0 | Status: DISCONTINUED | OUTPATIENT
Start: 2025-07-15 | End: 2025-07-15

## 2025-07-14 RX ORDER — DEXTROSE 50 % IN WATER 50 %
15 SYRINGE (ML) INTRAVENOUS ONCE
Refills: 0 | Status: DISCONTINUED | OUTPATIENT
Start: 2025-07-14 | End: 2025-07-15

## 2025-07-14 RX ORDER — APIXABAN 5 MG/1
5 TABLET, FILM COATED ORAL
Refills: 0 | Status: DISCONTINUED | OUTPATIENT
Start: 2025-07-15 | End: 2025-07-15

## 2025-07-14 RX ORDER — INSULIN GLARGINE-YFGN 100 [IU]/ML
6 INJECTION, SOLUTION SUBCUTANEOUS AT BEDTIME
Refills: 0 | Status: DISCONTINUED | OUTPATIENT
Start: 2025-07-14 | End: 2025-07-15

## 2025-07-14 RX ORDER — ASPIRIN 325 MG
325 TABLET ORAL DAILY
Refills: 0 | Status: DISCONTINUED | OUTPATIENT
Start: 2025-07-14 | End: 2025-07-14

## 2025-07-14 RX ORDER — INSULIN LISPRO 100 U/ML
INJECTION, SOLUTION INTRAVENOUS; SUBCUTANEOUS
Refills: 0 | Status: DISCONTINUED | OUTPATIENT
Start: 2025-07-14 | End: 2025-07-15

## 2025-07-14 RX ORDER — METOPROLOL SUCCINATE 50 MG/1
25 TABLET, EXTENDED RELEASE ORAL DAILY
Refills: 0 | Status: DISCONTINUED | OUTPATIENT
Start: 2025-07-15 | End: 2025-07-15

## 2025-07-14 RX ORDER — GABAPENTIN 400 MG/1
600 CAPSULE ORAL THREE TIMES A DAY
Refills: 0 | Status: DISCONTINUED | OUTPATIENT
Start: 2025-07-14 | End: 2025-07-15

## 2025-07-14 RX ADMIN — GABAPENTIN 600 MILLIGRAM(S): 400 CAPSULE ORAL at 22:41

## 2025-07-14 RX ADMIN — Medication 400 MILLIGRAM(S): at 22:41

## 2025-07-14 RX ADMIN — Medication 500 MILLILITER(S): at 11:53

## 2025-07-14 RX ADMIN — Medication 325 MILLIGRAM(S): at 11:51

## 2025-07-14 RX ADMIN — Medication 50 MILLILITER(S): at 12:55

## 2025-07-14 RX ADMIN — ATORVASTATIN CALCIUM 80 MILLIGRAM(S): 80 TABLET, FILM COATED ORAL at 22:41

## 2025-07-15 ENCOUNTER — TRANSCRIPTION ENCOUNTER (OUTPATIENT)
Age: 70
End: 2025-07-15

## 2025-07-15 VITALS
OXYGEN SATURATION: 100 % | RESPIRATION RATE: 17 BRPM | SYSTOLIC BLOOD PRESSURE: 98 MMHG | DIASTOLIC BLOOD PRESSURE: 50 MMHG | HEART RATE: 62 BPM | TEMPERATURE: 98 F

## 2025-07-15 PROBLEM — I25.5 ISCHEMIC CARDIOMYOPATHY: Chronic | Status: ACTIVE | Noted: 2025-07-09

## 2025-07-15 PROBLEM — E78.5 HYPERLIPIDEMIA, UNSPECIFIED: Chronic | Status: ACTIVE | Noted: 2025-07-09

## 2025-07-15 PROBLEM — I10 ESSENTIAL (PRIMARY) HYPERTENSION: Chronic | Status: ACTIVE | Noted: 2025-07-09

## 2025-07-15 LAB
ANION GAP SERPL CALC-SCNC: 10 MMOL/L — SIGNIFICANT CHANGE UP (ref 5–17)
BUN SERPL-MCNC: 25 MG/DL — HIGH (ref 7–23)
CALCIUM SERPL-MCNC: 8.1 MG/DL — LOW (ref 8.4–10.5)
CHLORIDE SERPL-SCNC: 101 MMOL/L — SIGNIFICANT CHANGE UP (ref 96–108)
CO2 SERPL-SCNC: 22 MMOL/L — SIGNIFICANT CHANGE UP (ref 22–31)
CREAT SERPL-MCNC: 1.03 MG/DL — SIGNIFICANT CHANGE UP (ref 0.5–1.3)
EGFR: 79 ML/MIN/1.73M2 — SIGNIFICANT CHANGE UP
EGFR: 79 ML/MIN/1.73M2 — SIGNIFICANT CHANGE UP
GLUCOSE SERPL-MCNC: 139 MG/DL — HIGH (ref 70–99)
HCT VFR BLD CALC: 35 % — LOW (ref 39–50)
HGB BLD-MCNC: 11.8 G/DL — LOW (ref 13–17)
MAGNESIUM SERPL-MCNC: 1.8 MG/DL — SIGNIFICANT CHANGE UP (ref 1.6–2.6)
MCHC RBC-ENTMCNC: 30.3 PG — SIGNIFICANT CHANGE UP (ref 27–34)
MCHC RBC-ENTMCNC: 33.7 G/DL — SIGNIFICANT CHANGE UP (ref 32–36)
MCV RBC AUTO: 90 FL — SIGNIFICANT CHANGE UP (ref 80–100)
NRBC # BLD AUTO: 0 K/UL — SIGNIFICANT CHANGE UP (ref 0–0)
NRBC # FLD: 0 K/UL — SIGNIFICANT CHANGE UP (ref 0–0)
NRBC BLD AUTO-RTO: 0 /100 WBCS — SIGNIFICANT CHANGE UP (ref 0–0)
PLATELET # BLD AUTO: 171 K/UL — SIGNIFICANT CHANGE UP (ref 150–400)
PMV BLD: 11.5 FL — SIGNIFICANT CHANGE UP (ref 7–13)
POTASSIUM SERPL-MCNC: 4.5 MMOL/L — SIGNIFICANT CHANGE UP (ref 3.5–5.3)
POTASSIUM SERPL-SCNC: 4.5 MMOL/L — SIGNIFICANT CHANGE UP (ref 3.5–5.3)
RBC # BLD: 3.89 M/UL — LOW (ref 4.2–5.8)
RBC # FLD: 16.7 % — HIGH (ref 10.3–14.5)
SODIUM SERPL-SCNC: 133 MMOL/L — LOW (ref 135–145)
WBC # BLD: 7.26 K/UL — SIGNIFICANT CHANGE UP (ref 3.8–10.5)
WBC # FLD AUTO: 7.26 K/UL — SIGNIFICANT CHANGE UP (ref 3.8–10.5)

## 2025-07-15 PROCEDURE — 80053 COMPREHEN METABOLIC PANEL: CPT

## 2025-07-15 PROCEDURE — 36415 COLL VENOUS BLD VENIPUNCTURE: CPT

## 2025-07-15 PROCEDURE — 82962 GLUCOSE BLOOD TEST: CPT

## 2025-07-15 PROCEDURE — 82553 CREATINE MB FRACTION: CPT

## 2025-07-15 PROCEDURE — C1760: CPT

## 2025-07-15 PROCEDURE — 85027 COMPLETE CBC AUTOMATED: CPT

## 2025-07-15 PROCEDURE — 85610 PROTHROMBIN TIME: CPT

## 2025-07-15 PROCEDURE — 80048 BASIC METABOLIC PNL TOTAL CA: CPT

## 2025-07-15 PROCEDURE — C1761: CPT

## 2025-07-15 PROCEDURE — C1874: CPT

## 2025-07-15 PROCEDURE — 82550 ASSAY OF CK (CPK): CPT

## 2025-07-15 PROCEDURE — C1889: CPT

## 2025-07-15 PROCEDURE — C1725: CPT

## 2025-07-15 PROCEDURE — C1724: CPT

## 2025-07-15 PROCEDURE — C1769: CPT

## 2025-07-15 PROCEDURE — 83036 HEMOGLOBIN GLYCOSYLATED A1C: CPT

## 2025-07-15 PROCEDURE — C1887: CPT

## 2025-07-15 PROCEDURE — 99239 HOSP IP/OBS DSCHRG MGMT >30: CPT

## 2025-07-15 PROCEDURE — C1894: CPT

## 2025-07-15 PROCEDURE — 85730 THROMBOPLASTIN TIME PARTIAL: CPT

## 2025-07-15 PROCEDURE — 83735 ASSAY OF MAGNESIUM: CPT

## 2025-07-15 PROCEDURE — 80061 LIPID PANEL: CPT

## 2025-07-15 PROCEDURE — C1753: CPT

## 2025-07-15 PROCEDURE — 93321 DOPPLER ECHO F-UP/LMTD STD: CPT

## 2025-07-15 RX ORDER — ASPIRIN 325 MG
1 TABLET ORAL
Qty: 30 | Refills: 11
Start: 2025-07-15 | End: 2026-07-09

## 2025-07-15 RX ORDER — CLOPIDOGREL BISULFATE 75 MG/1
1 TABLET, FILM COATED ORAL
Qty: 30 | Refills: 11
Start: 2025-07-15 | End: 2026-07-09

## 2025-07-15 RX ORDER — APIXABAN 5 MG/1
1 TABLET, FILM COATED ORAL
Qty: 14 | Refills: 0
Start: 2025-07-15 | End: 2025-07-21

## 2025-07-15 RX ORDER — MAGNESIUM OXIDE 400 MG
800 TABLET ORAL ONCE
Refills: 0 | Status: COMPLETED | OUTPATIENT
Start: 2025-07-15 | End: 2025-07-15

## 2025-07-15 RX ORDER — ISOSORBIDE MONONITRATE 60 MG/1
1 TABLET, EXTENDED RELEASE ORAL
Qty: 30 | Refills: 3
Start: 2025-07-15 | End: 2025-11-11

## 2025-07-15 RX ADMIN — Medication 40 MILLIGRAM(S): at 06:56

## 2025-07-15 RX ADMIN — GABAPENTIN 600 MILLIGRAM(S): 400 CAPSULE ORAL at 14:06

## 2025-07-15 RX ADMIN — Medication 81 MILLIGRAM(S): at 12:19

## 2025-07-15 RX ADMIN — Medication 800 MILLIGRAM(S): at 09:18

## 2025-07-15 RX ADMIN — RANOLAZINE 500 MILLIGRAM(S): 1000 TABLET, FILM COATED, EXTENDED RELEASE ORAL at 06:56

## 2025-07-15 RX ADMIN — INSULIN LISPRO 4: 100 INJECTION, SOLUTION INTRAVENOUS; SUBCUTANEOUS at 12:19

## 2025-07-15 RX ADMIN — DAPAGLIFLOZIN 10 MILLIGRAM(S): 5 TABLET, FILM COATED ORAL at 12:19

## 2025-07-15 RX ADMIN — INSULIN GLARGINE-YFGN 6 UNIT(S): 100 INJECTION, SOLUTION SUBCUTANEOUS at 00:30

## 2025-07-15 RX ADMIN — INSULIN LISPRO 2: 100 INJECTION, SOLUTION INTRAVENOUS; SUBCUTANEOUS at 08:42

## 2025-07-15 RX ADMIN — APIXABAN 5 MILLIGRAM(S): 5 TABLET, FILM COATED ORAL at 06:55

## 2025-07-15 RX ADMIN — CLOPIDOGREL BISULFATE 75 MILLIGRAM(S): 75 TABLET, FILM COATED ORAL at 12:19

## 2025-07-15 RX ADMIN — METOPROLOL SUCCINATE 25 MILLIGRAM(S): 50 TABLET, EXTENDED RELEASE ORAL at 06:56

## 2025-07-15 RX ADMIN — GABAPENTIN 600 MILLIGRAM(S): 400 CAPSULE ORAL at 06:56

## 2025-07-15 NOTE — DISCHARGE NOTE NURSING/CASE MANAGEMENT/SOCIAL WORK - PATIENT PORTAL LINK FT
You can access the FollowMyHealth Patient Portal offered by [facility] by registering at the following website: [email or web address]. By joining [facility]’s FollowMyHealth portal, you will also be able to view your health information using other applications (apps) compatible with our system.

## 2025-07-15 NOTE — PATIENT PROFILE ADULT - NSPROGENSOURCEINFO_GEN_A_NUR
patient

## 2025-07-15 NOTE — DISCHARGE NOTE PROVIDER - NSDCFUADDINST_GEN_ALL_CORE_FT
SEEK IMMEDIATE MEDICAL CARE IF YOU HAVE ANY OF THE FOLLOWING SYMPTOMS: worsening chest pain, racing heart beat, unexplained jaw/neck/back pain, severe abdominal pain, shortness of breath, dizziness or lightheadedness, fainting, sweaty or clammy skin, vomiting, or coughing up blood. These symptoms may represent a serious problem that is an emergency. Do not wait to see if the symptoms will go away. Get medical help right away. Call 911 and do not drive yourself to the hospital.     We recommend a Mediterranean diet: Some suggestions include continue incorporating 2 or more servings per day of vegetables, fruits, and whole grains. Increase intake of fish and legumes/beans to 2 or more servings per week. Aim to increase intake of healthy fats, such as olive oil and avocados, and have a handful of nuts/seeds most days. Reduce red/processed meat consumption to 2 or fewer times per week.

## 2025-07-15 NOTE — PATIENT PROFILE ADULT - FALL HARM RISK - HARM RISK INTERVENTIONS

## 2025-07-15 NOTE — PATIENT PROFILE ADULT - IN THE PAST 12 MONTHS, HAS LACK OF RELIABLE TRANSPORTATION KEPT YOU FROM MEDICAL APPOINTMENTS, MEETINGS, WORK OR FROM GETTING THINGS NEEDED FOR DAILY LIVING?
CMP, lipid, and TSH are expected 10/10/2023 would you like these done on 04/04/2023? Please advise.  
Can do flp cmp   Tsh not due  
Orders entered.   
Patient coming in on  for Dr Peterson orders but  - please advise, thank you!   
no

## 2025-07-15 NOTE — DISCHARGE NOTE NURSING/CASE MANAGEMENT/SOCIAL WORK - FINANCIAL ASSISTANCE
[facility] provides services at a reduced cost to those who are determined to be eligible through [facility]’s financial assistance program. Information regarding [facility]’s financial assistance program can be found by going to [email or web address] or by calling [phone].

## 2025-07-15 NOTE — DISCHARGE NOTE PROVIDER - CARE PROVIDER_API CALL
Cristian Salmeron [facility]  Interventional Cardiology  [address], [address] [place] [address]  Phone: [phone]  Fax: [phone]  Established Patient  Follow Up Time:    Cristian Salmeron [facility]  Interventional Cardiology  [address], [address] [place] [address]  Phone: [phone]  Fax: [phone]  Established Patient  Scheduled Appointment: 07/22/2025 02:15 PM

## 2025-07-15 NOTE — DISCHARGE NOTE PROVIDER - ATTENDING DISCHARGE PHYSICAL EXAMINATION:
CAD   S/p cardiac cath 7/14/25: Impella assisted Rota/Shockwave/CHRISTIN x 3 pLAD ; RCA stent patent, LCx mod diffuse dz, EDP 4. Right femoral artery perclose x 2, left femoral vein vascade  TRIPLE THERAPY: triple therapy x1 week (Eliquis 5mg BID+ Aspirin 81 mg daily + Clopidogrel 75mg daily) then d/c Eliquis per Dr. Salmeron (per his review repeat TTE prior to cath does NOT reveal an LV thrombus)  STATIN: Lipitor 80mg  PPI: Pantoprazole 40mg daily.  Medication change: Decrease Imdur to 30mg QD     Of note, I personally independently reviewed echo pre impella placement, apical akinesis, swerlling of contrast, but no LV thrombus compared to prior echo where it was clearly present.  R femoral pulse sore, but no pulsatile mass or bruits. L ok.

## 2025-07-15 NOTE — DISCHARGE NOTE PROVIDER - DISCHARGE DATE
Adherence to aseptic technique: hand hygiene prior to donning barriers (gown, gloves), don cap and mask, sterile drape over patient 15-Jul-2025

## 2025-07-15 NOTE — DISCHARGE NOTE PROVIDER - NSDCMRMEDTOKEN_GEN_ALL_CORE_FT
clopidogrel 75 mg oral tablet: 1 tab(s) orally once a day  Eliquis 5 mg oral tablet: 1 tab(s) orally 2 times a day  Farxiga 10 mg oral tablet: 1 tab(s) orally once a day  gabapentin 600 mg oral tablet: 1 tab(s) orally 3 times a day  isosorbide mononitrate 60 mg oral tablet, extended release: 1 tab(s) orally once a day  Januvia 100 mg oral tablet: 1 tab(s) orally once a day  Lantus 100 units/mL subcutaneous solution: 12 unit(s) subcutaneous once a day (at bedtime) Took 5 units on 7/13/25 PM  Lipitor 80 mg oral tablet: 1 tab(s) orally once a day  metFORMIN 1000 mg oral tablet: 1 tab(s) orally 2 times a day  pantoprazole 40 mg oral delayed release tablet: 1 tab(s) orally once a day (before a meal)  Ranexa 500 mg oral tablet, extended release: 1 tab(s) orally 2 times a day  Toprol-XL 25 mg oral tablet, extended release: 1 tab(s) orally once a day  valsartan 40 mg oral tablet: 1 tab(s) orally once a day   aspirin 81 mg oral delayed release tablet: 1 tab(s) orally once a day  clopidogrel 75 mg oral tablet: 1 tab(s) orally once a day  Eliquis 5 mg oral tablet: 1 tab(s) orally 2 times a day Please take this medication two times daily until 7/22/2025 at night and then STOP this medication  Farxiga 10 mg oral tablet: 1 tab(s) orally once a day  gabapentin 600 mg oral tablet: 1 tab(s) orally 3 times a day  isosorbide mononitrate 30 mg oral tablet, extended release: 1 tab(s) orally once a day  Januvia 100 mg oral tablet: 1 tab(s) orally once a day  Lantus 100 units/mL subcutaneous solution: 12 unit(s) subcutaneous once a day (at bedtime) Took 5 units on 7/13/25 PM  metFORMIN 1000 mg oral tablet: 1 tab(s) orally 2 times a day  pantoprazole 40 mg oral delayed release tablet: 1 tab(s) orally once a day (before a meal)  Ranexa 500 mg oral tablet, extended release: 1 tab(s) orally 2 times a day  Toprol-XL 25 mg oral tablet, extended release: 1 tab(s) orally once a day  valsartan 40 mg oral tablet: 1 tab(s) orally once a day   aspirin 81 mg oral delayed release tablet: 1 tab(s) orally once a day  Cardiac Rehab: 3x week for 12 weeks. Dx: s/p PCI. Interventionalist: Dr. Salmeron  clopidogrel 75 mg oral tablet: 1 tab(s) orally once a day  Eliquis 5 mg oral tablet: 1 tab(s) orally 2 times a day Please take this medication two times daily until 7/22/2025 at night and then STOP this medication  Farxiga 10 mg oral tablet: 1 tab(s) orally once a day  gabapentin 600 mg oral tablet: 1 tab(s) orally 3 times a day  isosorbide mononitrate 30 mg oral tablet, extended release: 1 tab(s) orally once a day  Januvia 100 mg oral tablet: 1 tab(s) orally once a day  Lantus 100 units/mL subcutaneous solution: 12 unit(s) subcutaneous once a day (at bedtime) Took 5 units on 7/13/25 PM  metFORMIN 1000 mg oral tablet: 1 tab(s) orally 2 times a day  pantoprazole 40 mg oral delayed release tablet: 1 tab(s) orally once a day (before a meal)  Ranexa 500 mg oral tablet, extended release: 1 tab(s) orally 2 times a day  Toprol-XL 25 mg oral tablet, extended release: 1 tab(s) orally once a day  valsartan 40 mg oral tablet: 1 tab(s) orally once a day

## 2025-07-15 NOTE — DISCHARGE NOTE PROVIDER - PROVIDER TOKENS
PROVIDER:[TOKEN:[[id number]],ESTABLISHEDPATIENT:[T]] PROVIDER:[TOKEN:[[id number]],SCHEDULEDAPPT:[07/22/2025],SCHEDULEDAPPTTIME:[02:15 PM],ESTABLISHEDPATIENT:[T]]

## 2025-07-15 NOTE — DISCHARGE NOTE PROVIDER - HOSPITAL COURSE
71 y/o M, Y[place]ut/English speaking w/ PMHx HLD, IDDM, severe 2vCAD (cardiac cath @ [facility] 6/11/25 revealed p-dRCA s/p rota/CHRISTIN x2. 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 In light of pt's risk factors, initial CCS class III anginal symptoms, known physiologically significant disease, pt now presents to [facility] for staged PCI of the pLAD 99%.      S/p cardiac cath 7/14/25: Impella assissted Rota/Shockwave/CHRISTIN x 3 pLAD ; RCA stent patent, LCx mod diffuse dz, EDP 4. Right femoral artery perclose x 2, left femoral vein vascade. [Interventionalist: Dr. Cristian Salmeron].    TRIPLE THERAPY: triple therapy x1 (Eliquis BID+ Aspirin 81 mg daily + Clopidogrel 75mg daily) then d/c Eliquis per Dr. Salmeron (per his review repeat TTE prior to cath does NOT reveal an LV thrombus)  STATIN: Lipitor 80mg  PPI: Pantoprazole 40mg daily.      Pt admitted overnight for observation and telemetry monitoring. Pt seen and examined at bedside this AM without any complaints or events overnight, VSS, labs and telemetry reviewed and pt stable for discharge as discussed with Dr. Cain. Pt has received appropriate discharge instructions, including medication regimen, access site management and follow up with Dr. Salmeron in 1-2 weeks.      Cardiac Rehab (Post PCI): Education on benefits of Cardiac Rehab provided to patient: Yes, Referral and Prescription Given for Cardiac Rehab: Yes, Pt given list of locations & instructed to contact their insurance company to review list of participating providers.    Pt discharge copies detail cardiovascular history, medication testing/treatments OR has created a patient portal account and instructed to provider their records at their 1st appointment.    CVD (GLP-1 receptor agonist, SGLT2 inhibitor) meds discussed w/ patients and encouraged to discuss further with PMD or endo at next visit.   69 y/o M, Wolof/English speaking w/ PMHx HLD, IDDM, severe 2vCAD (cardiac cath @ [facility] 6/11/25 revealed p-dRCA s/p rota/CHRISTIN x2. 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 In light of pt's risk factors, initial CCS class III anginal symptoms, known physiologically significant disease, pt now presents to [facility] for staged PCI of the pLAD 99%.      S/p cardiac cath 7/14/25: Impella assisted Rota/Shockwave/CHRISTIN x 3 pLAD ; RCA stent patent, LCx mod diffuse dz, EDP 4. Right femoral artery perclose x 2, left femoral vein vascade. [Interventionalist: Dr. Cristian Salmeron].    TRIPLE THERAPY: triple therapy x1 (Eliquis BID+ Aspirin 81 mg daily + Clopidogrel 75mg daily) then d/c Eliquis per Dr. Salmeron (per his review repeat TTE prior to cath does NOT reveal an LV thrombus)  STATIN: Lipitor 80mg  PPI: Pantoprazole 40mg daily.    Pt admitted overnight for observation and telemetry monitoring. Pt seen and examined at bedside this AM without any complaints or events overnight, VSS, labs and telemetry reviewed and pt stable for discharge as discussed with Dr. Cain. RFA and LFV access sites examined and without signs of bleeding or hematoma formation. Femoral pulses intact to baseline bilaterally. Pt has received appropriate discharge instructions, including medication regimen, access site management and follow up with Dr. Salmeron on 7/22/2025 @ 2:15PM.     Cardiac Rehab (Post PCI): Education on benefits of Cardiac Rehab provided to patient: Yes, Referral and Prescription Given for Cardiac Rehab: Yes, Pt given list of locations & instructed to contact their insurance company to review list of participating providers.    Pt discharge copies detail cardiovascular history, medication testing/treatments OR has created a patient portal account and instructed to provider their records at their 1st appointment.    CVD (GLP-1 receptor agonist, SGLT2 inhibitor) meds discussed w/ patients and encouraged to discuss further with PMD or endo at next visit.   69 y/o M, Azeri/English speaking w/ PMHx HLD, IDDM, severe 2vCAD (cardiac cath @ [facility] 6/11/25 revealed p-dRCA s/p rota/CHRISTIN x2. 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 In light of pt's risk factors, initial CCS class III anginal symptoms, known physiologically significant disease, pt now presents to [facility] for staged PCI of the pLAD 99%.      S/p cardiac cath 7/14/25: Impella assisted Rota/Shockwave/CHRISTIN x 3 pLAD ; RCA stent patent, LCx mod diffuse dz, EDP 4. Right femoral artery perclose x 2, left femoral vein vascade. [Interventionalist: Dr. Cristian Salmeron].    TRIPLE THERAPY: triple therapy x1 week (Eliquis 5mg BID+ Aspirin 81 mg daily + Clopidogrel 75mg daily) then d/c Eliquis per Dr. Salmeron (per his review repeat TTE prior to cath does NOT reveal an LV thrombus)  STATIN: Lipitor 80mg  PPI: Pantoprazole 40mg daily.  Medication change: Decrease Imdur to 30mg QD     Pt admitted overnight for observation and telemetry monitoring. Pt seen and examined at bedside this AM without any complaints or events overnight, VSS, labs and telemetry reviewed and pt stable for discharge as discussed with Dr. Cain. RFA and LFV access sites examined and without signs of bleeding or hematoma formation. Femoral pulses intact to baseline bilaterally. Pt has received appropriate discharge instructions, including medication regimen, access site management and follow up with Dr. Salmeron on 7/22/2025 @ 2:15PM.     Cardiac Rehab (Post PCI): Education on benefits of Cardiac Rehab provided to patient: Yes, Referral and Prescription Given for Cardiac Rehab: Yes, Pt given list of locations & instructed to contact their insurance company to review list of participating providers.    Pt discharge copies detail cardiovascular history, medication testing/treatments OR has created a patient portal account and instructed to provider their records at their 1st appointment.    CVD (GLP-1 receptor agonist, SGLT2 inhibitor) meds discussed w/ patients and encouraged to discuss further with PMD or endo at next visit.

## 2025-07-15 NOTE — DISCHARGE NOTE PROVIDER - NSDCFUSCHEDAPPT_GEN_ALL_CORE_FT
Cristian Salmeron  [facility]  [facility] [id number]  Scheduled Appointment: 07/22/2025

## 2025-07-16 RX ORDER — METFORMIN HYDROCHLORIDE 1000 MG/1
1000 TABLET, COATED ORAL TWICE DAILY
Refills: 0 | Status: ACTIVE | COMMUNITY
Start: 2025-07-16

## 2025-07-16 RX ORDER — SITAGLIPTIN 100 MG/1
100 TABLET, FILM COATED ORAL DAILY
Refills: 0 | Status: ACTIVE | COMMUNITY
Start: 2025-07-16

## 2025-07-16 RX ORDER — GABAPENTIN 600 MG/1
600 TABLET, COATED ORAL 3 TIMES DAILY
Refills: 0 | Status: ACTIVE | COMMUNITY
Start: 2025-07-16

## 2025-07-16 RX ORDER — INSULIN GLARGINE 100 [IU]/ML
100 INJECTION, SOLUTION SUBCUTANEOUS
Refills: 0 | Status: ACTIVE | COMMUNITY
Start: 2025-07-16

## 2025-07-16 RX ORDER — RANOLAZINE 500 MG/1
500 TABLET, FILM COATED, EXTENDED RELEASE ORAL
Refills: 0 | Status: DISCONTINUED | COMMUNITY
Start: 2025-07-16 | End: 2025-07-16

## 2025-07-16 RX ORDER — PANTOPRAZOLE 40 MG/1
40 TABLET, DELAYED RELEASE ORAL DAILY
Refills: 0 | Status: ACTIVE | COMMUNITY
Start: 2025-07-16
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
## 2025-07-19 DIAGNOSIS — Z79.4 LONG TERM (CURRENT) USE OF INSULIN: ICD-10-CM

## 2025-07-19 DIAGNOSIS — E78.5 HYPERLIPIDEMIA, UNSPECIFIED: ICD-10-CM
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
## 2025-07-19 DIAGNOSIS — T82.855A STENOSIS OF CORONARY ARTERY STENT, INITIAL ENCOUNTER: ICD-10-CM

## 2025-07-19 DIAGNOSIS — Z79.02 LONG TERM (CURRENT) USE OF ANTITHROMBOTICS/ANTIPLATELETS: ICD-10-CM

## 2025-07-19 DIAGNOSIS — I25.118 ATHEROSCLEROTIC HEART DISEASE OF NATIVE CORONARY ARTERY WITH OTHER FORMS OF ANGINA PECTORIS: ICD-10-CM

## 2025-07-19 DIAGNOSIS — E11.9 TYPE 2 DIABETES MELLITUS WITHOUT COMPLICATIONS: ICD-10-CM

## 2025-07-19 DIAGNOSIS — I25.84 CORONARY ATHEROSCLEROSIS DUE TO CALCIFIED CORONARY LESION: ICD-10-CM

## 2025-07-19 DIAGNOSIS — I11.0 HYPERTENSIVE HEART DISEASE WITH HEART FAILURE: ICD-10-CM

## 2025-07-19 DIAGNOSIS — Z79.84 LONG TERM (CURRENT) USE OF ORAL HYPOGLYCEMIC DRUGS: ICD-10-CM

## 2025-07-19 DIAGNOSIS — I25.5 ISCHEMIC CARDIOMYOPATHY: ICD-10-CM

## 2025-07-19 DIAGNOSIS — I25.10 ATHEROSCLEROTIC HEART DISEASE OF NATIVE CORONARY ARTERY WITHOUT ANGINA PECTORIS: ICD-10-CM

## 2025-07-19 DIAGNOSIS — Z79.01 LONG TERM (CURRENT) USE OF ANTICOAGULANTS: ICD-10-CM

## 2025-07-19 DIAGNOSIS — I50.22 CHRONIC SYSTOLIC (CONGESTIVE) HEART FAILURE: ICD-10-CM

## 2025-07-19 DIAGNOSIS — Z95.5 PRESENCE OF CORONARY ANGIOPLASTY IMPLANT AND GRAFT: ICD-10-CM

## 2025-07-19 DIAGNOSIS — I25.82 CHRONIC TOTAL OCCLUSION OF CORONARY ARTERY: ICD-10-CM

## 2025-07-22 ENCOUNTER — APPOINTMENT (OUTPATIENT)
Dept: HEART AND VASCULAR | Facility: CLINIC | Age: 70
End: 2025-07-22
Payer: MEDICARE

## 2025-07-22 DIAGNOSIS — E11.9 TYPE 2 DIABETES MELLITUS W/OUT COMPLICATIONS: ICD-10-CM

## 2025-07-22 DIAGNOSIS — I50.20 UNSPECIFIED SYSTOLIC (CONGESTIVE) HEART FAILURE: ICD-10-CM

## 2025-07-22 DIAGNOSIS — E78.5 HYPERLIPIDEMIA, UNSPECIFIED: ICD-10-CM

## 2025-07-22 DIAGNOSIS — I10 ESSENTIAL (PRIMARY) HYPERTENSION: ICD-10-CM

## 2025-07-22 DIAGNOSIS — I25.10 ATHEROSCLEROTIC HEART DISEASE OF NATIVE CORONARY ARTERY W/OUT ANGINA PECTORIS: ICD-10-CM

## 2025-07-22 DIAGNOSIS — Z79.4 TYPE 2 DIABETES MELLITUS W/OUT COMPLICATIONS: ICD-10-CM

## 2025-07-22 PROCEDURE — 99213 OFFICE O/P EST LOW 20 MIN: CPT | Mod: 93

## 2025-07-23 PROBLEM — E78.5 HYPERLIPIDEMIA: Status: ACTIVE | Noted: 2025-06-11

## 2025-07-23 PROBLEM — I51.3 INTRACARDIAC THROMBOSIS, NOT ELSEWHERE CLASSIFIED: Chronic | Status: ACTIVE | Noted: 2025-07-09

## 2025-07-23 PROBLEM — I25.10 ATHEROSCLEROTIC HEART DISEASE OF NATIVE CORONARY ARTERY WITHOUT ANGINA PECTORIS: Chronic | Status: ACTIVE | Noted: 2025-07-09

## 2025-07-23 PROBLEM — I50.20 HEART FAILURE WITH MID-RANGE EJECTION FRACTION (HFMEF): Status: ACTIVE | Noted: 2025-06-11

## 2025-07-23 PROBLEM — I25.10 CORONARY ARTERY DISEASE: Status: ACTIVE | Noted: 2025-06-11

## 2025-07-23 PROBLEM — E11.9 TYPE 2 DIABETES MELLITUS WITHOUT COMPLICATION, WITH LONG-TERM CURRENT USE OF INSULIN: Status: ACTIVE | Noted: 2025-06-11

## 2025-07-23 PROBLEM — I10 HYPERTENSION: Status: ACTIVE | Noted: 2025-06-11
